# Patient Record
Sex: MALE | Race: WHITE | NOT HISPANIC OR LATINO | Employment: FULL TIME | URBAN - METROPOLITAN AREA
[De-identification: names, ages, dates, MRNs, and addresses within clinical notes are randomized per-mention and may not be internally consistent; named-entity substitution may affect disease eponyms.]

---

## 2020-02-16 ENCOUNTER — APPOINTMENT (EMERGENCY)
Dept: CT IMAGING | Facility: HOSPITAL | Age: 25
End: 2020-02-16
Payer: COMMERCIAL

## 2020-02-16 ENCOUNTER — HOSPITAL ENCOUNTER (EMERGENCY)
Facility: HOSPITAL | Age: 25
End: 2020-02-17
Attending: EMERGENCY MEDICINE
Payer: COMMERCIAL

## 2020-02-16 ENCOUNTER — APPOINTMENT (EMERGENCY)
Dept: RADIOLOGY | Facility: HOSPITAL | Age: 25
End: 2020-02-16
Payer: COMMERCIAL

## 2020-02-16 DIAGNOSIS — S06.0X9A CONCUSSION: ICD-10-CM

## 2020-02-16 DIAGNOSIS — S22.49XA MULTIPLE RIB FRACTURES: ICD-10-CM
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
## 2020-02-16 DIAGNOSIS — S36.520A: ICD-10-CM

## 2020-02-16 DIAGNOSIS — S36.113A LIVER LACERATION, CLOSED, INITIAL ENCOUNTER: ICD-10-CM

## 2020-02-16 DIAGNOSIS — S32.039A CLOSED FRACTURE OF THIRD LUMBAR VERTEBRA, UNSPECIFIED FRACTURE MORPHOLOGY, INITIAL ENCOUNTER (HCC): ICD-10-CM

## 2020-02-16 DIAGNOSIS — S16.1XXA STRAIN OF NECK MUSCLE, INITIAL ENCOUNTER: ICD-10-CM

## 2020-02-16 LAB
ALBUMIN SERPL BCP-MCNC: 4.6 G/DL (ref 3.5–5.7)
ALP SERPL-CCNC: 84 U/L (ref 40–150)
ALT SERPL W P-5'-P-CCNC: 276 U/L (ref 7–52)
ANION GAP SERPL CALCULATED.3IONS-SCNC: 11 MMOL/L (ref 4–13)
AST SERPL W P-5'-P-CCNC: 257 U/L (ref 13–39)
BASOPHILS # BLD AUTO: 0 THOUSANDS/ΜL (ref 0–0.1)
BASOPHILS NFR BLD AUTO: 0 % (ref 0–2)
BILIRUB SERPL-MCNC: 0.4 MG/DL (ref 0.2–1)
BUN SERPL-MCNC: 25 MG/DL (ref 7–25)
CALCIUM SERPL-MCNC: 9.2 MG/DL (ref 8.6–10.5)
CHLORIDE SERPL-SCNC: 108 MMOL/L (ref 98–107)
CO2 SERPL-SCNC: 20 MMOL/L (ref 21–31)
CREAT SERPL-MCNC: 1.28 MG/DL (ref 0.7–1.3)
EOSINOPHIL # BLD AUTO: 0.1 THOUSAND/ΜL (ref 0–0.61)
EOSINOPHIL NFR BLD AUTO: 1 % (ref 0–5)
ERYTHROCYTE [DISTWIDTH] IN BLOOD BY AUTOMATED COUNT: 13.4 % (ref 11.5–14.5)
GFR SERPL CREATININE-BSD FRML MDRD: 78 ML/MIN/1.73SQ M
GLUCOSE SERPL-MCNC: 104 MG/DL (ref 65–99)
HCT VFR BLD AUTO: 43.7 % (ref 42–47)
HGB BLD-MCNC: 14 G/DL (ref 14–18)
INR PPP: 1.12 (ref 0.9–1.5)
LIPASE SERPL-CCNC: <10 U/L (ref 11–82)
LYMPHOCYTES # BLD AUTO: 2.5 THOUSANDS/ΜL (ref 0.6–4.47)
LYMPHOCYTES NFR BLD AUTO: 13 % (ref 21–51)
MCH RBC QN AUTO: 28.9 PG (ref 26–34)
MCHC RBC AUTO-ENTMCNC: 32 G/DL (ref 31–37)
MCV RBC AUTO: 90 FL (ref 81–99)
MONOCYTES # BLD AUTO: 1.2 THOUSAND/ΜL (ref 0.17–1.22)
MONOCYTES NFR BLD AUTO: 6 % (ref 2–12)
NEUTROPHILS # BLD AUTO: 15.9 THOUSANDS/ΜL (ref 1.4–6.5)
NEUTS SEG NFR BLD AUTO: 81 % (ref 42–75)
PLATELET # BLD AUTO: 288 THOUSANDS/UL (ref 149–390)
PMV BLD AUTO: 7.6 FL (ref 8.6–11.7)
POTASSIUM SERPL-SCNC: 3.9 MMOL/L (ref 3.5–5.5)
PROT SERPL-MCNC: 6.7 G/DL (ref 6.4–8.9)
PROTHROMBIN TIME: 12.9 SECONDS (ref 10.2–13)
RBC # BLD AUTO: 4.84 MILLION/UL (ref 4.3–5.9)
SODIUM SERPL-SCNC: 139 MMOL/L (ref 134–143)
WBC # BLD AUTO: 19.7 THOUSAND/UL (ref 4.8–10.8)

## 2020-02-16 PROCEDURE — 72131 CT LUMBAR SPINE W/O DYE: CPT

## 2020-02-16 PROCEDURE — 36415 COLL VENOUS BLD VENIPUNCTURE: CPT | Performed by: EMERGENCY MEDICINE

## 2020-02-16 PROCEDURE — 85610 PROTHROMBIN TIME: CPT | Performed by: EMERGENCY MEDICINE

## 2020-02-16 PROCEDURE — 72125 CT NECK SPINE W/O DYE: CPT

## 2020-02-16 PROCEDURE — 99284 EMERGENCY DEPT VISIT MOD MDM: CPT | Performed by: EMERGENCY MEDICINE

## 2020-02-16 PROCEDURE — 73130 X-RAY EXAM OF HAND: CPT

## 2020-02-16 PROCEDURE — 96361 HYDRATE IV INFUSION ADD-ON: CPT

## 2020-02-16 PROCEDURE — 83690 ASSAY OF LIPASE: CPT | Performed by: EMERGENCY MEDICINE

## 2020-02-16 PROCEDURE — 73590 X-RAY EXAM OF LOWER LEG: CPT

## 2020-02-16 PROCEDURE — 70450 CT HEAD/BRAIN W/O DYE: CPT

## 2020-02-16 PROCEDURE — 80053 COMPREHEN METABOLIC PANEL: CPT | Performed by: EMERGENCY MEDICINE

## 2020-02-16 PROCEDURE — 96374 THER/PROPH/DIAG INJ IV PUSH: CPT

## 2020-02-16 PROCEDURE — 85025 COMPLETE CBC W/AUTO DIFF WBC: CPT | Performed by: EMERGENCY MEDICINE

## 2020-02-16 PROCEDURE — 74177 CT ABD & PELVIS W/CONTRAST: CPT

## 2020-02-16 PROCEDURE — 71260 CT THORAX DX C+: CPT

## 2020-02-16 PROCEDURE — 99285 EMERGENCY DEPT VISIT HI MDM: CPT

## 2020-02-16 RX ORDER — FENTANYL CITRATE 50 UG/ML
25 INJECTION, SOLUTION INTRAMUSCULAR; INTRAVENOUS ONCE
Status: COMPLETED | OUTPATIENT
Start: 2020-02-16 | End: 2020-02-16

## 2020-02-16 RX ORDER — ONDANSETRON 2 MG/ML
4 INJECTION INTRAMUSCULAR; INTRAVENOUS ONCE
Status: DISCONTINUED | OUTPATIENT
Start: 2020-02-16 | End: 2020-02-16

## 2020-02-16 RX ORDER — MORPHINE SULFATE 4 MG/ML
4 INJECTION, SOLUTION INTRAMUSCULAR; INTRAVENOUS ONCE
Status: DISCONTINUED | OUTPATIENT
Start: 2020-02-16 | End: 2020-02-16

## 2020-02-16 RX ORDER — SODIUM CHLORIDE 9 MG/ML
125 INJECTION, SOLUTION INTRAVENOUS CONTINUOUS
Status: DISCONTINUED | OUTPATIENT
Start: 2020-02-16 | End: 2020-02-17 | Stop reason: HOSPADM

## 2020-02-16 RX ORDER — HYDROCODONE BITARTRATE AND ACETAMINOPHEN 5; 325 MG/1; MG/1
1 TABLET ORAL ONCE
Status: COMPLETED | OUTPATIENT
Start: 2020-02-16 | End: 2020-02-16

## 2020-02-16 RX ADMIN — SODIUM CHLORIDE 1000 ML: 0.9 INJECTION, SOLUTION INTRAVENOUS at 20:35

## 2020-02-16 RX ADMIN — SODIUM CHLORIDE 125 ML/HR: 0.9 INJECTION, SOLUTION INTRAVENOUS at 23:23

## 2020-02-16 RX ADMIN — HYDROCODONE BITARTRATE AND ACETAMINOPHEN 1 TABLET: 5; 325 TABLET ORAL at 20:32

## 2020-02-16 RX ADMIN — IOHEXOL 100 ML: 350 INJECTION, SOLUTION INTRAVENOUS at 22:03

## 2020-02-16 RX ADMIN — FENTANYL CITRATE 25 MCG: 50 INJECTION INTRAMUSCULAR; INTRAVENOUS at 23:23

## 2020-02-17 ENCOUNTER — HOSPITAL ENCOUNTER (INPATIENT)
Facility: HOSPITAL | Age: 25
LOS: 1 days | Discharge: HOME | DRG: 184 | End: 2020-02-18
Attending: SURGERY | Admitting: SURGERY
Payer: COMMERCIAL

## 2020-02-17 VITALS
WEIGHT: 185 LBS | DIASTOLIC BLOOD PRESSURE: 64 MMHG | OXYGEN SATURATION: 94 % | TEMPERATURE: 98.3 F | BODY MASS INDEX: 26.48 KG/M2 | SYSTOLIC BLOOD PRESSURE: 128 MMHG | HEIGHT: 70 IN | RESPIRATION RATE: 21 BRPM | HEART RATE: 79 BPM

## 2020-02-17 DIAGNOSIS — S22.41XA CLOSED FRACTURE OF MULTIPLE RIBS OF RIGHT SIDE, INITIAL ENCOUNTER: ICD-10-CM

## 2020-02-17 DIAGNOSIS — D68.2 FACTOR V DEFICIENCY (HCC): Primary | ICD-10-CM

## 2020-02-17 PROBLEM — R79.89 ELEVATED LFTS: Status: ACTIVE | Noted: 2020-02-17

## 2020-02-17 PROBLEM — S36.500A: Status: ACTIVE | Noted: 2020-02-17

## 2020-02-17 PROBLEM — S32.009A LUMBAR TRANSVERSE PROCESS FRACTURE, CLOSED, INITIAL ENCOUNTER (HCC): Status: ACTIVE | Noted: 2020-02-17

## 2020-02-17 PROBLEM — E84.9 CYSTIC FIBROSIS (HCC): Status: ACTIVE | Noted: 2020-02-17

## 2020-02-17 PROBLEM — S36.113A LIVER LACERATION: Status: ACTIVE | Noted: 2020-02-17

## 2020-02-17 PROBLEM — E84.9 CYSTIC FIBROSIS (HCC): Chronic | Status: ACTIVE | Noted: 2020-02-17

## 2020-02-17 PROBLEM — D72.829 LEUKOCYTOSIS: Status: ACTIVE | Noted: 2020-02-17

## 2020-02-17 LAB
ALBUMIN SERPL BCP-MCNC: 3.5 G/DL (ref 3.5–5)
ALP SERPL-CCNC: 89 U/L (ref 46–116)
ALT SERPL W P-5'-P-CCNC: 272 U/L (ref 12–78)
ANION GAP SERPL CALCULATED.3IONS-SCNC: 6 MMOL/L (ref 4–13)
AST SERPL W P-5'-P-CCNC: 161 U/L (ref 5–45)
BASOPHILS # BLD AUTO: 0.03 THOUSANDS/ΜL (ref 0–0.1)
BASOPHILS NFR BLD AUTO: 0 % (ref 0–1)
BILIRUB DIRECT SERPL-MCNC: 0.21 MG/DL (ref 0–0.2)
BILIRUB SERPL-MCNC: 0.65 MG/DL (ref 0.2–1)
BUN SERPL-MCNC: 18 MG/DL (ref 5–25)
CALCIUM SERPL-MCNC: 8.3 MG/DL (ref 8.3–10.1)
CHLORIDE SERPL-SCNC: 112 MMOL/L (ref 100–108)
CO2 SERPL-SCNC: 22 MMOL/L (ref 21–32)
CREAT SERPL-MCNC: 0.98 MG/DL (ref 0.6–1.3)
EOSINOPHIL # BLD AUTO: 0.06 THOUSAND/ΜL (ref 0–0.61)
EOSINOPHIL NFR BLD AUTO: 1 % (ref 0–6)
ERYTHROCYTE [DISTWIDTH] IN BLOOD BY AUTOMATED COUNT: 12.9 % (ref 11.6–15.1)
ERYTHROCYTE [DISTWIDTH] IN BLOOD BY AUTOMATED COUNT: 13.1 % (ref 11.6–15.1)
GFR SERPL CREATININE-BSD FRML MDRD: 107 ML/MIN/1.73SQ M
GLUCOSE SERPL-MCNC: 106 MG/DL (ref 65–140)
HCT VFR BLD AUTO: 35 % (ref 36.5–49.3)
HCT VFR BLD AUTO: 35.7 % (ref 36.5–49.3)
HGB BLD-MCNC: 11.4 G/DL (ref 12–17)
HGB BLD-MCNC: 11.8 G/DL (ref 12–17)
IMM GRANULOCYTES # BLD AUTO: 0.04 THOUSAND/UL (ref 0–0.2)
IMM GRANULOCYTES NFR BLD AUTO: 0 % (ref 0–2)
LYMPHOCYTES # BLD AUTO: 2.7 THOUSANDS/ΜL (ref 0.6–4.47)
LYMPHOCYTES NFR BLD AUTO: 24 % (ref 14–44)
MCH RBC QN AUTO: 29.5 PG (ref 26.8–34.3)
MCH RBC QN AUTO: 29.6 PG (ref 26.8–34.3)
MCHC RBC AUTO-ENTMCNC: 32.6 G/DL (ref 31.4–37.4)
MCHC RBC AUTO-ENTMCNC: 33.1 G/DL (ref 31.4–37.4)
MCV RBC AUTO: 90 FL (ref 82–98)
MCV RBC AUTO: 91 FL (ref 82–98)
MONOCYTES # BLD AUTO: 1.11 THOUSAND/ΜL (ref 0.17–1.22)
MONOCYTES NFR BLD AUTO: 10 % (ref 4–12)
NEUTROPHILS # BLD AUTO: 7.39 THOUSANDS/ΜL (ref 1.85–7.62)
NEUTS SEG NFR BLD AUTO: 65 % (ref 43–75)
NRBC BLD AUTO-RTO: 0 /100 WBCS
PLATELET # BLD AUTO: 190 THOUSANDS/UL (ref 149–390)
PLATELET # BLD AUTO: 219 THOUSANDS/UL (ref 149–390)
PMV BLD AUTO: 9.6 FL (ref 8.9–12.7)
PMV BLD AUTO: 9.7 FL (ref 8.9–12.7)
POTASSIUM SERPL-SCNC: 3.8 MMOL/L (ref 3.5–5.3)
PROT SERPL-MCNC: 6.1 G/DL (ref 6.4–8.2)
RBC # BLD AUTO: 3.86 MILLION/UL (ref 3.88–5.62)
RBC # BLD AUTO: 3.99 MILLION/UL (ref 3.88–5.62)
SODIUM SERPL-SCNC: 140 MMOL/L (ref 136–145)
WBC # BLD AUTO: 11.33 THOUSAND/UL (ref 4.31–10.16)
WBC # BLD AUTO: 9.12 THOUSAND/UL (ref 4.31–10.16)

## 2020-02-17 PROCEDURE — 80048 BASIC METABOLIC PNL TOTAL CA: CPT | Performed by: PHYSICIAN ASSISTANT

## 2020-02-17 PROCEDURE — 80076 HEPATIC FUNCTION PANEL: CPT | Performed by: PHYSICIAN ASSISTANT

## 2020-02-17 PROCEDURE — 85027 COMPLETE CBC AUTOMATED: CPT | Performed by: PHYSICIAN ASSISTANT

## 2020-02-17 PROCEDURE — 99231 SBSQ HOSP IP/OBS SF/LOW 25: CPT | Performed by: SURGERY

## 2020-02-17 PROCEDURE — 94640 AIRWAY INHALATION TREATMENT: CPT

## 2020-02-17 PROCEDURE — NC001 PR NO CHARGE: Performed by: SURGERY

## 2020-02-17 PROCEDURE — 97166 OT EVAL MOD COMPLEX 45 MIN: CPT

## 2020-02-17 PROCEDURE — 85025 COMPLETE CBC W/AUTO DIFF WBC: CPT | Performed by: EMERGENCY MEDICINE

## 2020-02-17 PROCEDURE — 99291 CRITICAL CARE FIRST HOUR: CPT | Performed by: SURGERY

## 2020-02-17 PROCEDURE — 96361 HYDRATE IV INFUSION ADD-ON: CPT

## 2020-02-17 PROCEDURE — 96376 TX/PRO/DX INJ SAME DRUG ADON: CPT

## 2020-02-17 PROCEDURE — 97163 PT EVAL HIGH COMPLEX 45 MIN: CPT

## 2020-02-17 PROCEDURE — 94760 N-INVAS EAR/PLS OXIMETRY 1: CPT

## 2020-02-17 PROCEDURE — 99285 EMERGENCY DEPT VISIT HI MDM: CPT

## 2020-02-17 RX ORDER — CHLORHEXIDINE GLUCONATE 0.12 MG/ML
15 RINSE ORAL EVERY 12 HOURS SCHEDULED
Status: DISCONTINUED | OUTPATIENT
Start: 2020-02-17 | End: 2020-02-18 | Stop reason: HOSPADM

## 2020-02-17 RX ORDER — LEVALBUTEROL 1.25 MG/.5ML
1.25 SOLUTION, CONCENTRATE RESPIRATORY (INHALATION)
Status: DISCONTINUED | OUTPATIENT
Start: 2020-02-17 | End: 2020-02-18 | Stop reason: HOSPADM

## 2020-02-17 RX ORDER — LIDOCAINE 50 MG/G
1 PATCH TOPICAL DAILY
Status: DISCONTINUED | OUTPATIENT
Start: 2020-02-17 | End: 2020-02-18 | Stop reason: HOSPADM

## 2020-02-17 RX ORDER — MELATONIN
5000 DAILY
Status: DISCONTINUED | OUTPATIENT
Start: 2020-02-17 | End: 2020-02-18 | Stop reason: HOSPADM

## 2020-02-17 RX ORDER — POTASSIUM CHLORIDE 20 MEQ/1
20 TABLET, EXTENDED RELEASE ORAL ONCE
Status: COMPLETED | OUTPATIENT
Start: 2020-02-17 | End: 2020-02-17

## 2020-02-17 RX ORDER — MINOCYCLINE HYDROCHLORIDE 100 MG/1
100 CAPSULE ORAL 2 TIMES DAILY
Status: ON HOLD | COMMUNITY
Start: 2018-08-22 | End: 2020-02-17

## 2020-02-17 RX ORDER — GENTAMICIN SULFATE 40 MG/ML
300 INJECTION, SOLUTION INTRAMUSCULAR; INTRAVENOUS 2 TIMES DAILY
COMMUNITY
Start: 2018-04-24

## 2020-02-17 RX ORDER — FLUTICASONE FUROATE AND VILANTEROL 200; 25 UG/1; UG/1
1 POWDER RESPIRATORY (INHALATION) DAILY
Status: DISCONTINUED | OUTPATIENT
Start: 2020-02-17 | End: 2020-02-17

## 2020-02-17 RX ORDER — HYDROMORPHONE HCL/PF 1 MG/ML
0.5 SYRINGE (ML) INJECTION EVERY 4 HOURS PRN
Status: DISCONTINUED | OUTPATIENT
Start: 2020-02-17 | End: 2020-02-18 | Stop reason: HOSPADM

## 2020-02-17 RX ORDER — LEVALBUTEROL 1.25 MG/.5ML
1.25 SOLUTION, CONCENTRATE RESPIRATORY (INHALATION) 2 TIMES DAILY
Status: DISCONTINUED | OUTPATIENT
Start: 2020-02-17 | End: 2020-02-17

## 2020-02-17 RX ORDER — ALBUTEROL SULFATE 2.5 MG/3ML
2.5 SOLUTION RESPIRATORY (INHALATION) EVERY 6 HOURS PRN
COMMUNITY
Start: 2017-12-11

## 2020-02-17 RX ORDER — FENTANYL CITRATE 50 UG/ML
25 INJECTION, SOLUTION INTRAMUSCULAR; INTRAVENOUS ONCE
Status: COMPLETED | OUTPATIENT
Start: 2020-02-17 | End: 2020-02-17

## 2020-02-17 RX ORDER — PANTOPRAZOLE SODIUM 40 MG/1
40 TABLET, DELAYED RELEASE ORAL
Status: DISCONTINUED | OUTPATIENT
Start: 2020-02-18 | End: 2020-02-18 | Stop reason: HOSPADM

## 2020-02-17 RX ORDER — SODIUM CHLORIDE FOR INHALATION 7 %
4 VIAL, NEBULIZER (ML) INHALATION 2 TIMES DAILY
COMMUNITY
Start: 2018-03-30

## 2020-02-17 RX ORDER — IBUPROFEN 400 MG/1
400 TABLET ORAL EVERY 6 HOURS SCHEDULED
Status: DISCONTINUED | OUTPATIENT
Start: 2020-02-17 | End: 2020-02-17

## 2020-02-17 RX ORDER — AMOXICILLIN 250 MG
1 CAPSULE ORAL 2 TIMES DAILY
Status: DISCONTINUED | OUTPATIENT
Start: 2020-02-17 | End: 2020-02-18 | Stop reason: HOSPADM

## 2020-02-17 RX ORDER — ONDANSETRON 2 MG/ML
4 INJECTION INTRAMUSCULAR; INTRAVENOUS EVERY 4 HOURS PRN
Status: DISCONTINUED | OUTPATIENT
Start: 2020-02-17 | End: 2020-02-18 | Stop reason: HOSPADM

## 2020-02-17 RX ORDER — ALBUTEROL SULFATE 90 UG/1
2 AEROSOL, METERED RESPIRATORY (INHALATION) EVERY 4 HOURS PRN
Status: DISCONTINUED | OUTPATIENT
Start: 2020-02-17 | End: 2020-02-18 | Stop reason: HOSPADM

## 2020-02-17 RX ORDER — LEVALBUTEROL TARTRATE 45 UG/1
1-2 AEROSOL, METERED ORAL EVERY 4 HOURS PRN
COMMUNITY
Start: 2019-10-28

## 2020-02-17 RX ORDER — SODIUM CHLORIDE 30 MG/ML INHALATION SOLUTION 30 MG/ML
4 SOLUTION INHALANT 2 TIMES DAILY
Status: DISCONTINUED | OUTPATIENT
Start: 2020-02-17 | End: 2020-02-17

## 2020-02-17 RX ORDER — PANTOPRAZOLE SODIUM 40 MG/1
40 TABLET, DELAYED RELEASE ORAL
COMMUNITY
Start: 2018-03-26

## 2020-02-17 RX ORDER — OXYCODONE HYDROCHLORIDE 5 MG/1
5 TABLET ORAL EVERY 4 HOURS PRN
Status: DISCONTINUED | OUTPATIENT
Start: 2020-02-17 | End: 2020-02-18 | Stop reason: HOSPADM

## 2020-02-17 RX ORDER — PANTOPRAZOLE SODIUM 40 MG/1
40 TABLET, DELAYED RELEASE ORAL
Status: DISCONTINUED | OUTPATIENT
Start: 2020-02-17 | End: 2020-02-17

## 2020-02-17 RX ORDER — ACETAMINOPHEN 325 MG/1
975 TABLET ORAL EVERY 8 HOURS SCHEDULED
Status: DISCONTINUED | OUTPATIENT
Start: 2020-02-17 | End: 2020-02-18 | Stop reason: HOSPADM

## 2020-02-17 RX ORDER — POLYETHYLENE GLYCOL 3350 17 G/17G
17 POWDER, FOR SOLUTION ORAL DAILY PRN
Status: DISCONTINUED | OUTPATIENT
Start: 2020-02-17 | End: 2020-02-18 | Stop reason: HOSPADM

## 2020-02-17 RX ORDER — SODIUM CHLORIDE 30 MG/ML INHALATION SOLUTION 30 MG/ML
4 SOLUTION INHALANT
Status: DISCONTINUED | OUTPATIENT
Start: 2020-02-17 | End: 2020-02-18 | Stop reason: HOSPADM

## 2020-02-17 RX ORDER — METHOCARBAMOL 500 MG/1
500 TABLET, FILM COATED ORAL EVERY 6 HOURS SCHEDULED
Status: DISCONTINUED | OUTPATIENT
Start: 2020-02-17 | End: 2020-02-18 | Stop reason: HOSPADM

## 2020-02-17 RX ORDER — LEVALBUTEROL 1.25 MG/.5ML
1.25 SOLUTION, CONCENTRATE RESPIRATORY (INHALATION) 2 TIMES DAILY
COMMUNITY
Start: 2019-10-28

## 2020-02-17 RX ORDER — OXYCODONE HYDROCHLORIDE 10 MG/1
10 TABLET ORAL EVERY 4 HOURS PRN
Status: DISCONTINUED | OUTPATIENT
Start: 2020-02-17 | End: 2020-02-18 | Stop reason: HOSPADM

## 2020-02-17 RX ADMIN — OXYCODONE HYDROCHLORIDE 10 MG: 10 TABLET ORAL at 16:24

## 2020-02-17 RX ADMIN — PANCRELIPASE 36000 UNITS: 24000; 76000; 120000 CAPSULE, DELAYED RELEASE PELLETS ORAL at 08:26

## 2020-02-17 RX ADMIN — HYDROMORPHONE HYDROCHLORIDE 0.5 MG: 1 INJECTION, SOLUTION INTRAMUSCULAR; INTRAVENOUS; SUBCUTANEOUS at 03:29

## 2020-02-17 RX ADMIN — CHLORHEXIDINE GLUCONATE 0.12% ORAL RINSE 15 ML: 1.2 LIQUID ORAL at 08:25

## 2020-02-17 RX ADMIN — METHOCARBAMOL TABLETS 500 MG: 500 TABLET, COATED ORAL at 23:36

## 2020-02-17 RX ADMIN — SODIUM CHLORIDE SOLN NEBU 3% 4 ML: 3 NEBU SOLN at 07:43

## 2020-02-17 RX ADMIN — PANCRELIPASE 36000 UNITS: 24000; 76000; 120000 CAPSULE, DELAYED RELEASE PELLETS ORAL at 11:32

## 2020-02-17 RX ADMIN — ACETAMINOPHEN 975 MG: 325 TABLET ORAL at 06:17

## 2020-02-17 RX ADMIN — CHLORHEXIDINE GLUCONATE 0.12% ORAL RINSE 15 ML: 1.2 LIQUID ORAL at 21:41

## 2020-02-17 RX ADMIN — Medication 1 TABLET: at 08:25

## 2020-02-17 RX ADMIN — ENOXAPARIN SODIUM 30 MG: 30 INJECTION SUBCUTANEOUS at 21:41

## 2020-02-17 RX ADMIN — NYSTATIN 500000 UNITS: 100000 SUSPENSION ORAL at 11:22

## 2020-02-17 RX ADMIN — PANTOPRAZOLE SODIUM 40 MG: 40 TABLET, DELAYED RELEASE ORAL at 06:16

## 2020-02-17 RX ADMIN — LIDOCAINE 1 PATCH: 50 PATCH TOPICAL at 08:25

## 2020-02-17 RX ADMIN — ACETAMINOPHEN 975 MG: 325 TABLET ORAL at 15:23

## 2020-02-17 RX ADMIN — OXYCODONE HYDROCHLORIDE 5 MG: 5 TABLET ORAL at 23:38

## 2020-02-17 RX ADMIN — ACETAMINOPHEN 975 MG: 325 TABLET ORAL at 21:41

## 2020-02-17 RX ADMIN — LEVALBUTEROL HYDROCHLORIDE 1.25 MG: 1.25 SOLUTION, CONCENTRATE RESPIRATORY (INHALATION) at 21:20

## 2020-02-17 RX ADMIN — OXYCODONE HYDROCHLORIDE 5 MG: 5 TABLET ORAL at 11:30

## 2020-02-17 RX ADMIN — OXYCODONE HYDROCHLORIDE 5 MG: 5 TABLET ORAL at 03:30

## 2020-02-17 RX ADMIN — POTASSIUM CHLORIDE 20 MEQ: 1500 TABLET, EXTENDED RELEASE ORAL at 06:17

## 2020-02-17 RX ADMIN — MELATONIN 5000 UNITS: at 08:25

## 2020-02-17 RX ADMIN — METHOCARBAMOL TABLETS 500 MG: 500 TABLET, COATED ORAL at 17:47

## 2020-02-17 RX ADMIN — LEVALBUTEROL HYDROCHLORIDE 1.25 MG: 1.25 SOLUTION, CONCENTRATE RESPIRATORY (INHALATION) at 07:42

## 2020-02-17 RX ADMIN — NYSTATIN 500000 UNITS: 100000 SUSPENSION ORAL at 08:25

## 2020-02-17 RX ADMIN — FENTANYL CITRATE 25 MCG: 50 INJECTION INTRAMUSCULAR; INTRAVENOUS at 01:14

## 2020-02-17 RX ADMIN — DORNASE ALFA 2.5 MG: 1 SOLUTION RESPIRATORY (INHALATION) at 08:05

## 2020-02-17 RX ADMIN — METHOCARBAMOL TABLETS 500 MG: 500 TABLET, COATED ORAL at 06:17

## 2020-02-17 RX ADMIN — SENNOSIDES AND DOCUSATE SODIUM 1 TABLET: 8.6; 5 TABLET ORAL at 17:47

## 2020-02-17 RX ADMIN — IBUPROFEN 400 MG: 400 TABLET ORAL at 06:16

## 2020-02-17 RX ADMIN — POLYETHYLENE GLYCOL 3350 17 G: 17 POWDER, FOR SOLUTION ORAL at 16:33

## 2020-02-17 RX ADMIN — PANCRELIPASE 36000 UNITS: 24000; 76000; 120000 CAPSULE, DELAYED RELEASE PELLETS ORAL at 18:30

## 2020-02-17 RX ADMIN — SODIUM CHLORIDE SOLN NEBU 3% 4 ML: 3 NEBU SOLN at 21:20

## 2020-02-17 RX ADMIN — METHOCARBAMOL TABLETS 500 MG: 500 TABLET, COATED ORAL at 11:22
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
## 2020-02-17 NOTE — SOCIAL WORK
CM met with pt and his family to discuss the role of CM  Pt lives with his father currently but plans to move to his mother's home upon d/c  Mother's home is a 1 story home which has 2STE  Pt works, drives, and was fully independent PTA  Pt owns no DME or living will  Pt's pharmacy is either [facility] or [facility] in [place] on [address]  Pt has no hx of mental health, substance abuse, IP rehab, or VNA  Pt will d/c home once medically stable  Pt would like to use meds to beds  CM reviewed d/c planning process including the following: identifying help at home, patient preference for d/c planning needs, Discharge Lounge, [facility] Meds to Bed program, availability of treatment team to discuss questions or concerns patient and/or family may have regarding understanding medications and recognizing signs and symptoms once discharged  CM also encouraged patient to follow up with all recommended appointments after discharge  Patient advised of importance for patient and family to participate in managing patients medical well being

## 2020-02-17 NOTE — ED PROVIDER NOTES
History  Chief Complaint   Patient presents with    Skiing Accident     pt lost ski and stopped to replace it; was run into by another skiier; c/o multiple sites of pain (right lower back, right hand and wrist, right shin, ribs ("wrapping around from my back"), right shin (small raised and tender area noted to lower shin)     Sp skiing accident  States he fell on skis and was getting up when he was blind sided by another skier  No loc, helmet on  Co lower back pain, right shin pain, right chest wall pain  No sob  Not anticoagulated  Denies reticular sx  None       Past Medical History:   Diagnosis Date    Cystic fibrosis ([facility] [address] )     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[facility] )        History reviewed  No pertinent surgical history  History reviewed  No pertinent family history  I have reviewed and agree with the history as documented  Social History     Tobacco Use    Smoking status: Never Smoker    Smokeless tobacco: Never Used   Substance Use Topics    Alcohol use: Yes     Comment: socially    Drug use: Never       Review of Systems   All other systems reviewed and are negative  Physical Exam  Physical Exam   Constitutional: He is oriented to person, place, and time  No distress  HENT:   Head: Normocephalic and atraumatic  Right Ear: External ear normal    Left Ear: External ear normal    Nose: Nose normal    Mouth/Throat: Oropharynx is clear and moist  No oropharyngeal exudate  Eyes: Pupils are equal, round, and reactive to light  Conjunctivae and EOM are normal  Right eye exhibits no discharge  Left eye exhibits no discharge  Neck: Normal range of motion  Neck supple  No JVD present  No tracheal deviation present  Cardiovascular: Normal heart sounds and intact distal pulses  Exam reveals no gallop and no friction rub  No murmur heard  Pulmonary/Chest: No stridor  No respiratory distress  He has no wheezes  He has no rales  He exhibits tenderness  Abdominal: Soft   Bowel sounds are normal  He exhibits no distension and no mass  There is tenderness  There is no rebound and no guarding  No hernia  Musculoskeletal: Normal range of motion  He exhibits no edema, tenderness or deformity  Mild ruq tenderness, soft abd wo peritoneal signs    Mild right chest wall tenderness  Lumbar spine tender, no other spinal tenderness  Head atraumatic  Neuro fully intact  Neurological: He is alert and oriented to person, place, and time  He displays normal reflexes  No sensory deficit  He exhibits normal muscle tone  Skin: Skin is warm and dry  Capillary refill takes less than 2 seconds  No rash noted  He is not diaphoretic  No pallor  Psychiatric: He has a normal mood and affect         Vital Signs  ED Triage Vitals [02/16/20 1955]   Temperature Pulse Respirations Blood Pressure SpO2   98 3 °F (36 8 °C) 100 18 140/90 98 %      Temp Source Heart Rate Source Patient Position - Orthostatic VS BP Location FiO2 (%)   Temporal Monitor Lying Left arm --      Pain Score       Worst Possible Pain           Vitals:    02/17/20 0000 02/17/20 0015 02/17/20 0030 02/17/20 0045   BP: 121/68 126/65 130/67 128/64   Pulse: 83 79 78 79   Patient Position - Orthostatic VS:             Visual Acuity      ED Medications  Medications   sodium chloride 0 9 % bolus 1,000 mL (0 mL Intravenous Stopped 2/16/20 2322)   HYDROcodone-acetaminophen (NORCO) 5-325 mg per tablet 1 tablet (1 tablet Oral Given 2/16/20 2032)   iohexol (OMNIPAQUE) 350 MG/ML injection (MULTI-DOSE) 100 mL (100 mL Intravenous Given 2/16/20 2203)   fentanyl citrate (PF) 100 MCG/2ML 25 mcg (25 mcg Intravenous Given 2/16/20 2323)   fentanyl citrate (PF) 100 MCG/2ML 25 mcg (25 mcg Intravenous Given 2/17/20 0114)       Diagnostic Studies  Results Reviewed     Procedure Component Value Units Date/Time    Lipase [[id number]]  (Abnormal) Collected:  02/16/20 2028    Lab Status:  Final result Specimen:  Blood Updated:  02/16/20 2232     Lipase <10 u/L     Protime-INR [[id number]] (Normal) Collected:  02/16/20 2219    Lab Status:  Final result Specimen:  Blood from Arm, Right Updated:  02/16/20 2229     Protime 12 9 seconds      INR 1 12    Comprehensive metabolic panel [[id number]]  (Abnormal) Collected:  02/16/20 2028    Lab Status:  Final result Specimen:  Blood from Arm, Left Updated:  02/16/20 2051     Sodium 139 mmol/L      Potassium 3 9 mmol/L      Chloride 108 mmol/L      CO2 20 mmol/L      ANION GAP 11 mmol/L      BUN 25 mg/dL      Creatinine 1 28 mg/dL      Glucose 104 mg/dL      Calcium 9 2 mg/dL       U/L       U/L      Alkaline Phosphatase 84 U/L      Total Protein 6 7 g/dL      Albumin 4 6 g/dL      Total Bilirubin 0 40 mg/dL      eGFR 78 ml/min/1 73sq m     Narrative:       [place] guidelines for Chronic Kidney Disease (CKD):     Stage 1 with normal or high GFR (GFR > 90 mL/min/1 73 square meters)    Stage 2 Mild CKD (GFR = 60-89 mL/min/1 73 square meters)    Stage 3A Moderate CKD (GFR = 45-59 mL/min/1 73 square meters)    Stage 3B Moderate CKD (GFR = 30-44 mL/min/1 73 square meters)    Stage 4 Severe CKD (GFR = 15-29 mL/min/1 73 square meters)    Stage 5 End Stage CKD (GFR <15 mL/min/1 73 square meters)  Note: GFR calculation is accurate only with a steady state creatinine    CBC and differential [[id number]]  (Abnormal) Collected:  02/16/20 2028    Lab Status:  Final result Specimen:  Blood from Arm, Left Updated:  02/16/20 2036     WBC 19 70 Thousand/uL      RBC 4 84 Million/uL      Hemoglobin 14 0 g/dL      Hematocrit 43 7 %      MCV 90 fL      MCH 28 9 pg      MCHC 32 0 g/dL      RDW 13 4 %      MPV 7 6 fL      Platelets 666 Thousands/uL      Neutrophils Relative 81 %      Lymphocytes Relative 13 %      Monocytes Relative 6 %      Eosinophils Relative 1 %      Basophils Relative 0 %      Neutrophils Absolute 15 90 Thousands/µL      Lymphocytes Absolute 2 50 Thousands/µL      Monocytes Absolute 1 20 Thousand/µL      Eosinophils Absolute 0 10 Thousand/µL      Basophils Absolute 0 00 Thousands/µL                  TRAUMA - CT chest abdomen pelvis w contrast   Final Result by Nancy Blankenship MD (02/16 2310)      There is fracture of the right transverse process of L3  There is nondisplaced fracture of the right 12th rib, posteriorly  There appear to be nondisplaced hairline fractures of the right 6th through 9th ribs, posteriorly  There is a small amount of free fluid adjacent to the distal tip of the right lobe of the liver  There are findings suspicious for small hepatic laceration in the subcapsular region of the posterior segment right hepatic lobe  Clinical correlation and    follow-up is recommended  Findings suspicious for injury to the ascending colon near the hepatic flexure of the colon, as described above  Please see discussion  Close clinical correlation and follow-up is recommended  The pancreas is markedly atrophic  Small renal cysts  No hydronephrosis  I personally discussed this study with Dr Gillian Simmonds on 2/16/2020 at 10:31 PM                Workstation performed: [id number]         CT spine lumbar without contrast   Final Result by Nancy Blankenship MD (02/16 2310)      There is fracture of the right transverse process of L3  There is nondisplaced fracture of the right 12th rib, posteriorly  There appear to be nondisplaced hairline fractures of the right 6th through 9th ribs, posteriorly  There is a small amount of free fluid adjacent to the distal tip of the right lobe of the liver  There are findings suspicious for small hepatic laceration in the subcapsular region of the posterior segment right hepatic lobe  Clinical correlation and    follow-up is recommended  Findings suspicious for injury to the ascending colon near the hepatic flexure of the colon, as described above  Please see discussion  Close clinical correlation and follow-up is recommended        The pancreas is markedly atrophic  Small renal cysts  No hydronephrosis  I personally discussed this study with Dr Juve Connell on 2/16/2020 at 10:31 PM                Workstation performed: [id number]         TRAUMA - CT spine cervical wo contrast   Final Result by Omega Alvarez MD (02/16 2310)      No cervical spine fracture or traumatic malalignment  I personally discussed this study with Dr Juve Connell on 2/16/2020 at 10:14 PM                       Workstation performed: [id number]         TRAUMA - CT head wo contrast   Final Result by Omega Alvarez MD (02/16 2311)      No acute intracranial abnormality  Paranasal sinus disease, as described above  Please see discussion  I personally discussed this study with Dr Juve Connell on 2/16/2020 at 10:04 PM                            Workstation performed: [id number]         XR hand 3+ views RIGHT   Final Result by Monisha Lees DO (02/17 5482)      No acute osseous abnormality  Workstation performed: [id number]         XR tibia fibula 2 views RIGHT   Final Result by Nate Fairbanks MD (02/17 5411)      No acute osseous abnormality  Workstation performed: [id number]                    Procedures  Procedures         ED Course  ED Course as of Feb 18 1321   Sun Feb 16, 2020 2153 TRAUMA - CT chest abdomen pelvis w contrast   2154 TRAUMA - CT chest abdomen pelvis w contrast   2257 Eosinophils: 1                               MDM  Number of Diagnoses or Management Options  Closed fracture of third lumbar vertebra, unspecified fracture morphology, initial encounter [facility]):   Concussion:   Contusion of ascending colon, initial encounter:   Liver laceration, closed, initial encounter:   Multiple rib fractures:   Skiing accident, initial encounter:   Strain of neck muscle, initial encounter:   Diagnosis management comments: Pt has been signed out to dr Nellie Malone pending final dispo and ct results           Disposition  Final diagnoses:   Skiing accident, initial encounter   Strain of neck muscle, initial encounter   Concussion   Liver laceration, closed, initial encounter   Contusion of ascending colon, initial encounter   Closed fracture of third lumbar vertebra, unspecified fracture morphology, initial encounter [facility])   Multiple rib fractures     Time reflects when diagnosis was documented in both MDM as applicable and the Disposition within this note     Time User Action Codes Description Comment    2/16/2020 11:12 PM Tom Kitchen Add [address] Skiing accident, initial encounter     2/16/2020 11:12 PM Tom Kitchen Add [S16  1XXA] Strain of neck muscle, initial encounter     2/16/2020 11:12 PM Tom Kitchen Add [S06 0X9A] Concussion     2/16/2020 11:13 PM Tom Kitchen Add [P05 139D] Liver laceration, closed, initial encounter     2/16/2020 11:13 PM Tom Kitchen Add [S36 520A] Contusion of ascending colon, initial encounter     2/16/2020 11:13 PM Tom Kitchen Add [S32 039A] Closed fracture of third lumbar vertebra, unspecified fracture morphology, initial encounter ([facility] [address] )     2/16/2020 11:14 PM Tom Kitchen Add [S22 49XA] Multiple rib fractures       ED Disposition     ED Disposition Condition Date/Time Comment    Transfer to Another Facility-In [facility] Feb 16, 2020 11:14 PM Jordy Grant should be transferred out to [facility] Documentation      Most Recent Value   Patient Condition  The patient has been stabilized such that within reasonable medical probability, no material deterioration of the patient condition or the condition of the unborn child(cameron) is likely to result from the transfer, No noted underlying medical condition requiring transfer to another facility   Transfer is per preference and request of patient and/or family   Reason for Transfer  Level of Care needed not available at this facility   Benefits of Transfer  Specialized equipment and/or services available at the receiving facility (Include comment)________________________   Risks of Transfer  Potential for delay in receiving treatment, Potential deterioration of medical condition, Loss of IV, Increased discomfort during transfer, Possible worsening of condition or death during transfer   Accepting Physician  Dr Sven Hudson Name, Trent Samples by (Company and Unit #)  [facility] WITH [facility]   Sending MD London Dan      RN Documentation      Most [facility] Name, [facility] 284    Bed Assignment  ED Trauma   Report Given to  Loretta Baeza RN   [facility]  [facility]      Follow-up Information    None         There are no discharge medications for this patient  No discharge procedures on file      PDMP Review       Value Time User    PDMP Reviewed  Yes 2/18/2020 10:33 AM Glenn York PA-C          ED Provider  Electronically Signed by           Lux Rowe MD  02/18/20 [phone]

## 2020-02-17 NOTE — ASSESSMENT & PLAN NOTE
? Continue albuterol PRN, breo-ellipta, xopenex, pulmozyme, sodium chloride nebulizer, trikafta (patient brought this medication)

## 2020-02-17 NOTE — ASSESSMENT & PLAN NOTE
CT abd/pelvis 2/17: Findings suspicious for small hepatic laceration in the subcapsular region of posterior segment R hepatic lobe  · LFT trending down   · Serial LFTs

## 2020-02-17 NOTE — ASSESSMENT & PLAN NOTE
· CT abd/pelvis 2/17: Concerning for injury to ascending colon near hepatic flexure  Atrophic pancreas  § Serial exams  § Serial Hgbs  ?  Dx atrophic pancreas  § Likely 2/2 CF  § Continue Creon (pancrelipase) for atrophic pancreas

## 2020-02-17 NOTE — PLAN OF CARE
Problem: PHYSICAL THERAPY ADULT  Goal: Performs mobility at highest level of function for planned discharge setting  See evaluation for individualized goals  Description  Treatment/Interventions: Functional transfer training, Endurance training, Patient/family training, Bed mobility, Gait training, OT, Spoke to nursing  Equipment Recommended: (TBD)       See flowsheet documentation for full assessment, interventions and recommendations  Note:   Prognosis: Good  Problem List: Decreased strength, Decreased range of motion, Decreased endurance, Impaired balance, Decreased mobility, Pain  Assessment: Pt is 26 y o  male seen for PT evaluation s/p admit to [facility] on 2/17/2020 w/ Liver laceration, R rib fx 6-9;12, L3 TP fx s/p blunt skiing accident  PT consulted to assess pt's functional mobility and d/c needs  Order placed for PT eval and tx, w/ up w/ A, up as tolerated and ambulate patient order  Comorbidities affecting pt's physical performance at time of assessment include: cystic fibrosis  PTA, pt was ambulates unrestricted distances and all terrain, lives in multi-level home and works full time  Personal factors affecting pt at time of IE include: ambulating w/ assistive device, inability to navigate level surfaces w/o external assistance, inability to perform current job functions, unable to perform physical activity, inability to perform IADLs and inability to perform ADLs  Please find objective findings from PT assessment regarding body systems outlined above with impairments and limitations including weakness, decreased ROM, impaired balance, decreased endurance, gait deviations, pain and decreased activity tolerance  Pt demonstrated ability to complete bed mobility with increased time and S  Initially standing demonstated decreased standing posture and WB tolerance through painful RLE  Trial with RW which improved tolerance for ambulation with pain in RLE   Ambulated with slow step to gait pattern  Required breathing technique instruction during transfers with frequent breath holding  Pt will benefit from inpt skilled PT as he is functioning below baseline mobility which he is very physical at his job as a   The following objective measures performed on IE also reveal limitations: Barthel Index: 75/100  Pt's clinical presentation is currently unstable/unpredictable seen in pt's presentation of critical care monitoring, pain  Pt to benefit from continued PT tx to address deficits as defined above and maximize level of functional independent mobility and consistency  From PT/mobility standpoint, recommendation at time of d/c would be home with increased family support pending progress in order to facilitate return to PLOF  Barriers to Discharge: Inaccessible home environment  Barriers to Discharge Comments: Pt reports he will temp stay with mother  Recommendation: Home with family support     PT - OK to Discharge: Yes    See flowsheet documentation for full assessment

## 2020-02-17 NOTE — OCCUPATIONAL THERAPY NOTE
Occupational Therapy Evaluation     Patient Name: Leila Patiño  [id number] Date: 2/17/2020  Problem List  Principal Problem:    Possible liver laceration  Active Problems:    Fractures of the right 6-9 and 12th ribs    Ascending colon injury    Right transverse process fracture of L3    Cystic fibrosis (HCC)    Elevated LFTs    Leukocytosis    Past Medical History  Past Medical History:   Diagnosis Date    Cyst[facility] ([facility] [address] )     [facility] V deficiency ([facility] )      Past Surgical History  History reviewed  No pertinent surgical history  02/17/20 1021   Note Type   Note type Eval/Treat   Restrictions/Precautions   Weight Bearing Precautions Per Order No   Pain Assessment   Pain Assessment 0-10   Pain Score 5   Pain Type Acute pain   Pain Location Rib cage   Pain Orientation Right   Hospital Pain Intervention(s) Repositioned; Ambulation/increased activity; Emotional support   Home Living   Type of [address] Multi-level   Additional Comments pt reports he plans to stay with mother in ranch home with 2 TOM - mother is home and able to assist prn   Prior Function   Level of [place] Independent with ADLs and functional mobility   Lives With [employer] Help From Family   ADL Assistance Independent   IADLs Independent   Falls in the last 6 months 0   Vocational Full time employment   Lifestyle   Autonomy I adls and mobility - i iadls - shares homemaking with family   Reciprocal Relationships supportive family and friends   Service to Others works FT as    Intrinsic Gratification active pta   [address] [facility] offers no c/o    ADL   Eating Assistance 7  Independent   Grooming Assistance 7  [facility] 7  [address] 5  [address]  7  [address] 5  [address]  5  Supervision/Setup   Bed Mobility   Supine to   [address] Sit to Stand 5  Supervision   Stand to Sit 5  Supervision   Stand pivot 5  Supervision   Functional Mobility   Functional Mobility 5  Supervision   Additional items Rolling walker   Balance   Static Sitting Fair +   Dynamic Sitting Fair   Static Standing Fair   Dynamic Standing Fair -   Ambulatory Fair -   Activity Tolerance   Activity Tolerance Patient limited by fatigue;Patient limited by pain   RUE Assessment   RUE Assessment WFL   LUE Assessment   LUE Assessment WFL   Cognition   Overall Cognitive Status WFL   Assessment   Limitation Decreased endurance;Decreased self-care trans;Decreased high-level ADLs   Prognosis Good   Assessment Pt is a 26 y o  male who was admitted to [facility] on 2/17/2020 with Liver laceration and rib fx s/p collision with another person while skiing  Pt's problem list also includes PMH of pulmonary fibrosis  At baseline pt was completing adls and mobility independently w/o ad - I iadls   Pt lives with father in 2 story home with +TOM - reports he plans to stay with mother in 1 story home post d/c - mother is available to assist prn  Currently pt requires sba for overall ADLS and sba for functional mobility/transfers  Pt currently presents with impairments in the following categories -steps to enter environment and difficulty performing IADLS  activity tolerance, endurance and standing balance/tolerance  These impairments, as well as pt's fatigue and pain  limit pt's ability to safely engage in all baseline areas of occupation, includingfunctional mobility/transfers, community mobility, laundry , driving, house maintenance, meal prep, cleaning, work/volunteer work , social participation  and leisure activities however will have adequate support available prn post d/c from family and friends -  From OT standpoint, recommend home with family support  upon D/C  No further acute OT needs indicated at this time - Recommend continued oob for meals, ambulation to/from BR, setup for self care tasks and mobility in hallway with nursing/restorative - d/c from caseload with above recommendations   Goals   Patient Goals have less pain and go home    Plan   OT Frequency Eval only   Recommendation   OT Discharge Recommendation Home with family support   OT - OK to Discharge Yes     Claudeen Ares, OT
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
## 2020-02-17 NOTE — PLAN OF CARE
Problem: Prexisting or High Potential for Compromised Skin Integrity  Goal: Skin integrity is maintained or improved  Description  INTERVENTIONS:  - Identify patients at risk for skin breakdown  - Assess and monitor skin integrity  - Assess and monitor nutrition and hydration status  - Monitor labs   - Assess for incontinence   - Turn and reposition patient  - Assist with mobility/ambulation  - Relieve pressure over bony prominences  - Avoid friction and shearing  - Provide appropriate hygiene as needed including keeping skin clean and dry  - Evaluate need for skin moisturizer/barrier cream  - Collaborate with interdisciplinary team   - Patient/family teaching  - Consider wound care consult   Outcome: Progressing     Problem: Potential for Falls  Goal: Patient will remain free of falls  Description  INTERVENTIONS:  - Assess patient frequently for physical needs  -  Identify cognitive and physical deficits and behaviors that affect risk of falls    -  [place] fall precautions as indicated by assessment   - Educate patient/family on patient safety including physical limitations  - Instruct patient to call for assistance with activity based on assessment  - Modify environment to reduce risk of injury  - Consider OT/PT consult to assist with strengthening/mobility  Outcome: Progressing     Problem: COPING  Goal: Pt/Family able to verbalize concerns and demonstrate effective coping strategies  Description  INTERVENTIONS:  - Assist patient/family to identify coping skills, available support systems and cultural and spiritual values  - Provide emotional support, including active listening and acknowledgement of concerns of patient and caregivers  - Reduce environmental stimuli, as able  - Provide patient education  - Assess for spiritual pain/suffering and initiate spiritual care, including notification of Pastoral Care or [place] based community as needed  - Assess effectiveness of coping strategies  Outcome: Progressing  Goal: Will report anxiety at manageable levels  Description  INTERVENTIONS:  - Administer medication as ordered  - Teach and encourage coping skills  - Provide emotional support  - Assess patient/family for anxiety and ability to cope  Outcome: Progressing     Problem: Nutrition/Hydration-ADULT  Goal: Nutrient/Hydration intake appropriate for improving, restoring or maintaining nutritional needs  Description  Monitor and assess patient's nutrition/hydration status for malnutrition  Collaborate with interdisciplinary team and initiate plan and interventions as ordered  Monitor patient's weight and dietary intake as ordered or per policy  Utilize nutrition screening tool and intervene as necessary  Determine patient's food preferences and provide high-protein, high-caloric foods as appropriate  INTERVENTIONS:  - Monitor oral intake, urinary output, labs, and treatment plans  - Assess nutrition and hydration status and recommend course of action  - Evaluate amount of meals eaten  - Assist patient with eating if necessary   - Allow adequate time for meals  - Recommend/ encourage appropriate diets, oral nutritional supplements, and vitamin/mineral supplements  - Order, calculate, and assess calorie counts as needed  - Recommend, monitor, and adjust tube feedings and TPN/PPN based on assessed needs  - Assess need for intravenous fluids  - Provide specific nutrition/hydration education as appropriate  - Include patient/family/caregiver in decisions related to nutrition  Outcome: Progressing     Problem: Nutrition/Hydration-ADULT  Goal: Nutrient/Hydration intake appropriate for improving, restoring or maintaining nutritional needs  Description  Monitor and assess patient's nutrition/hydration status for malnutrition  Collaborate with interdisciplinary team and initiate plan and interventions as ordered  Monitor patient's weight and dietary intake as ordered or per policy   Utilize nutrition screening tool and intervene as necessary  Determine patient's food preferences and provide high-protein, high-caloric foods as appropriate       INTERVENTIONS:  - Monitor oral intake, urinary output, labs, and treatment plans  - Assess nutrition and hydration status and recommend course of action  - Evaluate amount of meals eaten  - Assist patient with eating if necessary   - Allow adequate time for meals  - Recommend/ encourage appropriate diets, oral nutritional supplements, and vitamin/mineral supplements  - Order, calculate, and assess calorie counts as needed  - Recommend, monitor, and adjust tube feedings and TPN/PPN based on assessed needs  - Assess need for intravenous fluids  - Provide specific nutrition/hydration education as appropriate  - Include patient/family/caregiver in decisions related to nutrition  Outcome: Progressing     Problem: NEUROSENSORY - ADULT  Goal: Achieves stable or improved neurological status  Description  INTERVENTIONS  - Monitor and report changes in neurological status  - Monitor vital signs such as temperature, blood pressure, glucose, and any other labs ordered   - Initiate measures to prevent increased intracranial pressure  - Monitor for seizure activity and implement precautions if appropriate      Outcome: Progressing  Goal: Remains free of injury related to seizures activity  Description  INTERVENTIONS  - Maintain airway, patient safety  and administer oxygen as ordered  - Monitor patient for seizure activity, document and report duration and description of seizure to physician/advanced practitioner  - If seizure occurs,  ensure patient safety during seizure  - Reorient patient post seizure  - Seizure pads on all 4 side rails  - Instruct patient/family to notify RN of any seizure activity including if an aura is experienced  - Instruct patient/family to call for assistance with activity based on nursing assessment  - Administer anti-seizure medications if ordered    Outcome: Progressing  Goal: Achieves maximal functionality and self care  Description  INTERVENTIONS  - Monitor swallowing and airway patency with patient fatigue and changes in neurological status  - Encourage and assist patient to increase activity and self care     - Encourage visually impaired, hearing impaired and aphasic patients to use assistive/communication devices  Outcome: Progressing     Problem: SKIN/TISSUE INTEGRITY - ADULT  Goal: Skin integrity remains intact  Description  INTERVENTIONS  - Identify patients at risk for skin breakdown  - Assess and monitor skin integrity  - Assess and monitor nutrition and hydration status  - Monitor labs (i e  albumin)  - Assess for incontinence   - Turn and reposition patient  - Assist with mobility/ambulation  - Relieve pressure over bony prominences  - Avoid friction and shearing  - Provide appropriate hygiene as needed including keeping skin clean and dry  - Evaluate need for skin moisturizer/barrier cream  - Collaborate with interdisciplinary team (i e  Nutrition, Rehabilitation, etc )   - Patient/family teaching  Outcome: Progressing  Goal: Incision(s), wounds(s) or drain site(s) healing without S/S of infection  Description  INTERVENTIONS  - Assess and document risk factors for skin impairment   - Assess and document dressing, incision, wound bed, drain sites and surrounding tissue  - Consider nutrition services referral as needed  - Oral mucous membranes remain intact  - Provide patient/ family education  Outcome: Progressing     Problem: HEMATOLOGIC - ADULT  Goal: Maintains hematologic stability  Description  INTERVENTIONS  - Assess for signs and symptoms of bleeding or hemorrhage  - Monitor labs  - Administer supportive blood products/factors as ordered and appropriate  Outcome: Progressing     Problem: MUSCULOSKELETAL - ADULT  Goal: Maintain or return mobility to safest level of function  Description  INTERVENTIONS:  - Assess patient's ability to carry out ADLs; assess patient's baseline for ADL function and identify physical deficits which impact ability to perform ADLs (bathing, care of mouth/teeth, toileting, grooming, dressing, etc )  - Assess/evaluate cause of self-care deficits   - Assess range of motion  - Assess patient's mobility  - Assess patient's need for assistive devices and provide as appropriate  - Encourage maximum independence but intervene and supervise when necessary  - Involve family in performance of ADLs  - Assess for home care needs following discharge   - Consider OT consult to assist with ADL evaluation and planning for discharge  - Provide patient education as appropriate  Outcome: Progressing     Problem: PAIN - ADULT  Goal: Verbalizes/displays adequate comfort level or baseline comfort level  Description  Interventions:  - Encourage patient to monitor pain and request assistance  - Assess pain using appropriate pain scale  - Administer analgesics based on type and severity of pain and evaluate response  - Implement non-pharmacological measures as appropriate and evaluate response  - Consider cultural and social influences on pain and pain management  - Notify physician/advanced practitioner if interventions unsuccessful or patient reports new pain  Outcome: Progressing     Problem: INFECTION - ADULT  Goal: Absence or prevention of progression during hospitalization  Description  INTERVENTIONS:  - Assess and monitor for signs and symptoms of infection  - Monitor lab/diagnostic results  - Monitor all insertion sites, i e  indwelling lines, tubes, and drains  - Monitor endotracheal if appropriate and nasal secretions for changes in amount and color  - [place] appropriate cooling/warming therapies per order  - Administer medications as ordered  - Instruct and encourage patient and family to use good hand hygiene technique  - Identify and instruct in appropriate isolation precautions for identified infection/condition  Outcome: Progressing  Goal: Absence of fever/infection during neutropenic period  Description  INTERVENTIONS:  - Monitor WBC    Outcome: Progressing     Problem: SAFETY ADULT  Goal: Maintain or return to baseline ADL function  Description  INTERVENTIONS:  -  Assess patient's ability to carry out ADLs; assess patient's baseline for ADL function and identify physical deficits which impact ability to perform ADLs (bathing, care of mouth/teeth, toileting, grooming, dressing, etc )  - Assess/evaluate cause of self-care deficits   - Assess range of motion  - Assess patient's mobility; develop plan if impaired  - Assess patient's need for assistive devices and provide as appropriate  - Encourage maximum independence but intervene and supervise when necessary  - Involve family in performance of ADLs  - Assess for home care needs following discharge   - Consider OT consult to assist with ADL evaluation and planning for discharge  - Provide patient education as appropriate  Outcome: Progressing  Goal: Maintain or return mobility status to optimal level  Description  INTERVENTIONS:  - Assess patient's baseline mobility status (ambulation, transfers, stairs, etc )    - Identify cognitive and physical deficits and behaviors that affect mobility  - Identify mobility aids required to assist with transfers and/or ambulation (gait belt, sit-to-stand, lift, walker, cane, etc )  - [place] fall precautions as indicated by assessment  - Record patient progress and toleration of activity level on Mobility SBAR; progress patient to next Phase/Stage  - Instruct patient to call for assistance with activity based on assessment  - Consider rehabilitation consult to assist with strengthening/weightbearing, etc   Outcome: Progressing     Problem: DISCHARGE PLANNING  Goal: Discharge to home or other facility with appropriate resources  Description  INTERVENTIONS:  - Identify barriers to discharge w/patient and caregiver  - Arrange for needed discharge resources and transportation as appropriate  - Identify discharge learning needs (meds, wound care, etc )  - Arrange for interpretive services to assist at discharge as needed  - Refer to Case Management Department for coordinating discharge planning if the patient needs post-hospital services based on physician/advanced practitioner order or complex needs related to functional status, cognitive ability, or social support system  Outcome: Progressing     Problem: DISCHARGE PLANNING  Goal: Discharge to home or other facility with appropriate resources  Description  INTERVENTIONS:  - Identify barriers to discharge w/patient and caregiver  - Arrange for needed discharge resources and transportation as appropriate  - Identify discharge learning needs (meds, wound care, etc )  - Arrange for interpretive services to assist at discharge as needed  - Refer to Case Management Department for coordinating discharge planning if the patient needs post-hospital services based on physician/advanced practitioner order or complex needs related to functional status, cognitive ability, or social support system  Outcome: Progressing     Problem: Knowledge Deficit  Goal: Patient/family/caregiver demonstrates understanding of disease process, treatment plan, medications, and discharge instructions  Description  Complete learning assessment and assess knowledge base    Interventions:  - Provide teaching at level of understanding  - Provide teaching via preferred learning methods  Outcome: Progressing

## 2020-02-17 NOTE — ASSESSMENT & PLAN NOTE
? Dx R transverse process fracture of L3  § Continue pain medications  § Neurovascularly intact at this time, continue to monitor

## 2020-02-17 NOTE — PROGRESS NOTES
Critical Care Attending Addendum:    I have personally seen and examined the patient during ICU rounds  I discussed the case with the team, including the practitioner who completed the critical care progress note and agree with the history, exam, assessment, and plan as outlined below except where otherwise noted  I personally reviewed imaging studies in PACS, laboratory data, medications, and vital signs and have noted significant findings  Patient seen on 2/17/2020 at 10:10 AM     Subjective:  Some right hand and right shin pain but otherwise ok  Feeling bloated as well  Objective:  Vitals:    02/17/20 1000   BP: 124/61   Pulse: 86   Resp: 21   Temp:    SpO2: 95%     NAD  Aox4  Ambulatory around unit with walker  NCAT, EOMI  R sided TTP, CTAB  Minimal RUQ / R flank TTP  Abdomen mildly distended but not tympanitic  No rebound/guarding  Extremities WWP  R hand mildly ecchymotic and swollen c/w L hand    Labs/imaging reviewed    Assessment:  - S/p blunt trauma during skiing accident  - Multiple closed right sided rib fractures - 6-9th, 12th rib  - Stranding of hepatic flexure of colon  - Right hepatic lobe small laceration, posterior segment  - Right L3 transverse process fracture  - Acute pain 2/2 above  - Hypokalemia  - Mild transaminitis likely 2/2 small hepatic laceration  - Leukocytosis 2/2 SIRS response  - ABLA from injuries  - Cystic fibrosis    Plan:  Neuro: multimodal analgesia including scheduled tylenol, lidoderm patch, robaxin and IV/PO PRN narcotic  CV: no issues, MAPs maintained > 65 mmHg  Resp: continue home CF meds  GI: start bowel regimen, continue home protonix  FEN: advance to regular diet as tolerated, monitor for changes in abdominal exam with diet resumption  : voiding spontaneously   Heme: start DVT ppx  ID: no signs/sx of infx  Endo: no issues  MSK: OOB; ambulatory in unit today with PT/OT  Lines: PIV    Dispo: transfer to floor today   Will eventually plan on [facility]pt [facility]    I have spent a total of 30 minutes in the care of this patient  Of this time, 30 minutes was spent in directly providing critical care services, which may include (but not limited to) titration of vasoactive medications, ventilator management, interpretation of hemodynamic data, managing enteral or parenteral nutritional support, complex medical decision making, management of multiple organ system failure, evaluating for the presence of life-threatening injuries or illnesses, or interpretation of complex medical databases  This does not include time spent on procedures or in family discussions      Elmer Mallory MD  2/17/2020

## 2020-02-17 NOTE — ASSESSMENT & PLAN NOTE
· CT abd/pelvis 2/17: Findings suspicious for small hepatic laceration in the subcapsular region of posterior segment R hepatic lobe  Also concerning for injury to ascending colon near hepatic flexure  Atrophic pancreas    § Serial exams  § Serial Hgbs  · Pharmacologic Prophylaxis: Pharmacologic VTE Prophylaxis contraindicated due to Concern for hepatic laceration  · Mechanical Prophylaxis: sequential compression device
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
## 2020-02-17 NOTE — TRAUMA DOCUMENTATION
Cystic Fibrosis Doctor is :    Sarai Corea (transplant department)  [facility]  [phone]

## 2020-02-17 NOTE — ED NOTES
PLACED ON MONITOR AND 2ND IV STARTED PER DR NUNO REQUEST   PT WITHOUT C/O     Vahe Oar, RN  02/16/20 9716

## 2020-02-17 NOTE — ASSESSMENT & PLAN NOTE
· SpO2 goal >95%  · CT chest 2/17: nondisplaced R 12 right fracture posteriorly, nondisplaced hairline fractures of the R 6-9th ribs, posteriorly    · Pulmonary toileting with IS  · Rib fracture protocol
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
## 2020-02-17 NOTE — RESPIRATORY THERAPY NOTE
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[address] )      Social History     Socioeconomic History    Marital status: Single     Spouse name: None    Number of children: None    Years of education: None    Highest education level: None   Occupational History    None   Social Needs    Financial resource strain: None    Food insecurity:     Worry: None     Inability: None    Transportation needs:     Medical: None     Non-medical: None   Tobacco Use    Smoking status: Never Smoker    Smokeless tobacco: Never Used   Substance and Sexual Activity    Alcohol use: Yes     Comment: socially    Drug use: Never    Sexual activity: None   Lifestyle    Physical activity:     Days per week: None     Minutes per session: None    Stress: None   Relationships    Social connections:     Talks on phone: None     Gets together: None     Attends [employer] service: None     Active member of club or organization: None     Attends meetings of clubs or organizations: None     Relationship status: None    Intimate partner violence:     Fear of current or ex partner: None     Emotionally abused: None     Physically abused: None     Forced sexual activity: None   Other Topics Concern    None   Social History Narrative    None       Subjective         Objective    Physical Exam:   Assessment Type: (P) Assess only  Respiratory Pattern: (P) Normal  Chest Assessment: (P) Chest expansion symmetrical  Bilateral Breath Sounds: (P) Diminished  R Breath Sounds: (P) Coarse(slightly coarse)  O2 Device: (P) ra    Vitals:  Blood pressure 124/60, pulse 86, temperature 97 5 °F (36 4 °C), temperature source Oral, resp  rate 18, SpO2 97 %              O2 Device: (P) ra     Plan    Respiratory Plan: (P) Home Bronchodilator Patient pathway        Resp Comments: (P) I assessed pt for resp protocol pt has no resp complaints at this time and in no distress, pt has CF and uses bid udn's and mdi's at home, per protocol I followed his home regimen of meds

## 2020-02-18 VITALS
HEART RATE: 75 BPM | OXYGEN SATURATION: 96 % | SYSTOLIC BLOOD PRESSURE: 108 MMHG | DIASTOLIC BLOOD PRESSURE: 64 MMHG | WEIGHT: 177.91 LBS | HEIGHT: 70 IN | RESPIRATION RATE: 16 BRPM | TEMPERATURE: 98.6 F | BODY MASS INDEX: 25.47 KG/M2

## 2020-02-18 PROBLEM — D72.829 LEUKOCYTOSIS: Status: RESOLVED | Noted: 2020-02-17 | Resolved: 2020-02-18

## 2020-02-18 LAB
ALBUMIN SERPL BCP-MCNC: 3.3 G/DL (ref 3.5–5)
ALP SERPL-CCNC: 87 U/L (ref 46–116)
ALT SERPL W P-5'-P-CCNC: 201 U/L (ref 12–78)
ANION GAP SERPL CALCULATED.3IONS-SCNC: 5 MMOL/L (ref 4–13)
AST SERPL W P-5'-P-CCNC: 78 U/L (ref 5–45)
BILIRUB DIRECT SERPL-MCNC: 0.23 MG/DL (ref 0–0.2)
BILIRUB SERPL-MCNC: 0.68 MG/DL (ref 0.2–1)
BUN SERPL-MCNC: 15 MG/DL (ref 5–25)
CALCIUM SERPL-MCNC: 8.5 MG/DL (ref 8.3–10.1)
CHLORIDE SERPL-SCNC: 109 MMOL/L (ref 100–108)
CO2 SERPL-SCNC: 26 MMOL/L (ref 21–32)
CREAT SERPL-MCNC: 0.96 MG/DL (ref 0.6–1.3)
ERYTHROCYTE [DISTWIDTH] IN BLOOD BY AUTOMATED COUNT: 12.9 % (ref 11.6–15.1)
GFR SERPL CREATININE-BSD FRML MDRD: 110 ML/MIN/1.73SQ M
GLUCOSE SERPL-MCNC: 102 MG/DL (ref 65–140)
HCT VFR BLD AUTO: 35.7 % (ref 36.5–49.3)
HGB BLD-MCNC: 11.5 G/DL (ref 12–17)
MCH RBC QN AUTO: 29 PG (ref 26.8–34.3)
MCHC RBC AUTO-ENTMCNC: 32.2 G/DL (ref 31.4–37.4)
MCV RBC AUTO: 90 FL (ref 82–98)
PLATELET # BLD AUTO: 189 THOUSANDS/UL (ref 149–390)
PMV BLD AUTO: 9.6 FL (ref 8.9–12.7)
POTASSIUM SERPL-SCNC: 3.7 MMOL/L (ref 3.5–5.3)
PROT SERPL-MCNC: 6.2 G/DL (ref 6.4–8.2)
RBC # BLD AUTO: 3.96 MILLION/UL (ref 3.88–5.62)
SODIUM SERPL-SCNC: 140 MMOL/L (ref 136–145)
WBC # BLD AUTO: 8.36 THOUSAND/UL (ref 4.31–10.16)

## 2020-02-18 PROCEDURE — 94640 AIRWAY INHALATION TREATMENT: CPT

## 2020-02-18 PROCEDURE — 80076 HEPATIC FUNCTION PANEL: CPT | Performed by: PHYSICIAN ASSISTANT

## 2020-02-18 PROCEDURE — 99238 HOSP IP/OBS DSCHRG MGMT 30/<: CPT | Performed by: SURGERY

## 2020-02-18 PROCEDURE — 80048 BASIC METABOLIC PNL TOTAL CA: CPT | Performed by: OBSTETRICS & GYNECOLOGY

## 2020-02-18 PROCEDURE — NC001 PR NO CHARGE: Performed by: PHYSICIAN ASSISTANT

## 2020-02-18 PROCEDURE — 97530 THERAPEUTIC ACTIVITIES: CPT

## 2020-02-18 PROCEDURE — 94760 N-INVAS EAR/PLS OXIMETRY 1: CPT

## 2020-02-18 PROCEDURE — 97116 GAIT TRAINING THERAPY: CPT

## 2020-02-18 PROCEDURE — 85027 COMPLETE CBC AUTOMATED: CPT | Performed by: PHYSICIAN ASSISTANT

## 2020-02-18 RX ORDER — ACETAMINOPHEN 325 MG/1
650 TABLET ORAL EVERY 4 HOURS PRN
Qty: 30 TABLET | Refills: 0
Start: 2020-02-18

## 2020-02-18 RX ORDER — METHOCARBAMOL 500 MG/1
500 TABLET, FILM COATED ORAL EVERY 6 HOURS SCHEDULED
Qty: 40 TABLET | Refills: 0 | Status: SHIPPED | OUTPATIENT
Start: 2020-02-18

## 2020-02-18 RX ORDER — POLYETHYLENE GLYCOL 3350 17 G/17G
17 POWDER, FOR SOLUTION ORAL DAILY PRN
Qty: 14 EACH | Refills: 0
Start: 2020-02-18

## 2020-02-18 RX ORDER — OXYCODONE HYDROCHLORIDE 5 MG/1
5-10 TABLET ORAL EVERY 4 HOURS PRN
Qty: 40 TABLET | Refills: 0 | Status: SHIPPED | OUTPATIENT
Start: 2020-02-18

## 2020-02-18 RX ORDER — AMOXICILLIN 250 MG
1 CAPSULE ORAL 2 TIMES DAILY
Qty: 14 TABLET | Refills: 0 | Status: SHIPPED | OUTPATIENT
Start: 2020-02-18 | End: 2020-02-25

## 2020-02-18 RX ADMIN — ACETAMINOPHEN 975 MG: 325 TABLET ORAL at 05:20

## 2020-02-18 RX ADMIN — METHOCARBAMOL TABLETS 500 MG: 500 TABLET, COATED ORAL at 05:20

## 2020-02-18 RX ADMIN — DORNASE ALFA 2.5 MG: 1 SOLUTION RESPIRATORY (INHALATION) at 07:03

## 2020-02-18 RX ADMIN — OXYCODONE HYDROCHLORIDE 5 MG: 5 TABLET ORAL at 05:20

## 2020-02-18 RX ADMIN — LEVALBUTEROL HYDROCHLORIDE 1.25 MG: 1.25 SOLUTION, CONCENTRATE RESPIRATORY (INHALATION) at 07:02

## 2020-02-18 RX ADMIN — PANTOPRAZOLE SODIUM 40 MG: 40 TABLET, DELAYED RELEASE ORAL at 05:20

## 2020-02-18 RX ADMIN — SENNOSIDES AND DOCUSATE SODIUM 1 TABLET: 8.6; 5 TABLET ORAL at 08:14

## 2020-02-18 RX ADMIN — PANCRELIPASE 180000 UNITS: 24000; 76000; 120000 CAPSULE, DELAYED RELEASE PELLETS ORAL at 08:16

## 2020-02-18 RX ADMIN — LIDOCAINE 1 PATCH: 50 PATCH TOPICAL at 08:14

## 2020-02-18 RX ADMIN — CHLORHEXIDINE GLUCONATE 0.12% ORAL RINSE 15 ML: 1.2 LIQUID ORAL at 08:14

## 2020-02-18 RX ADMIN — SODIUM CHLORIDE SOLN NEBU 3% 4 ML: 3 NEBU SOLN at 07:03

## 2020-02-18 RX ADMIN — NYSTATIN 500000 UNITS: 100000 SUSPENSION ORAL at 08:14

## 2020-02-18 RX ADMIN — PANCRELIPASE 180000 UNITS: 24000; 76000; 120000 CAPSULE, DELAYED RELEASE PELLETS ORAL at 11:40

## 2020-02-18 RX ADMIN — METHOCARBAMOL TABLETS 500 MG: 500 TABLET, COATED ORAL at 11:35

## 2020-02-18 RX ADMIN — ENOXAPARIN SODIUM 30 MG: 30 INJECTION SUBCUTANEOUS at 08:14

## 2020-02-18 RX ADMIN — MELATONIN 5000 UNITS: at 08:14

## 2020-02-18 RX ADMIN — POLYETHYLENE GLYCOL 3350 17 G: 17 POWDER, FOR SOLUTION ORAL at 08:13

## 2020-02-18 RX ADMIN — Medication 1 TABLET: at 08:14

## 2020-02-18 RX ADMIN — OXYCODONE HYDROCHLORIDE 10 MG: 10 TABLET ORAL at 11:35

## 2020-02-18 NOTE — ASSESSMENT & PLAN NOTE
? Continue albuterol PRN, breo-ellipta, xopenex, pulmozyme, sodium chloride nebulizer, trikafta (patient brought this medication)  ? Patient reports no breathing complications at this time   Denies cough or sputum production, shortness of breath, pain with deep breathing
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
## 2020-02-18 NOTE — ASSESSMENT & PLAN NOTE
· SpO2 goal >95%: currently 100% on room air  · CT chest 2/17: nondisplaced R 12 right fracture posteriorly, nondisplaced hairline fractures of the R 6-9th ribs, posteriorly    · Pulmonary toileting with IS: Achieving 2 5L on IS  · Rib fracture protocol: patient's pain well controlled with current regimen  · F/u with trauma in [place] in 2 weeks, given contact information in discharge instructions for Dr Tri Penn at [facility]

## 2020-02-18 NOTE — ASSESSMENT & PLAN NOTE
· CT abd/pelvis 2/17: Concerning for injury to ascending colon near hepatic flexure  Atrophic pancreas  § Serial exams: on exam today, the patient states his abdomen feels less distended  § Serial Hgbs: Hgb have remained stable  ?  Dx atrophic pancreas  § Likely 2/2 CF  § Continue Creon (pancrelipase) for atrophic pancreas

## 2020-02-18 NOTE — SOCIAL WORK
Pt's medications are $21 63  Family paid over phone  Pt is supposed to do steps and then will d/c home

## 2020-02-18 NOTE — ASSESSMENT & PLAN NOTE
· CT abd/pelvis 2/17: Findings suspicious for small hepatic laceration in the subcapsular region of posterior segment R hepatic lobe  Also concerning for injury to ascending colon near hepatic flexure  Atrophic pancreas    § Serial exams: abdominal exam stable  § Serial Hgbs: Hgb have remained stable, 11 5 this AM  · Chemical prophylaxis initiated with Lovenox  · Mechanical Prophylaxis: sequential compression device

## 2020-02-18 NOTE — PHYSICAL THERAPY NOTE
PHYSICAL THERAPY NOTE    Patient Name: Saddie Goldberg GUYFJOE'Y Date: 2/18/2020 02/18/20 1200   Pain Assessment   Pain Assessment 0-10   Pain Score 7   Pain Type Acute pain   Pain Location Back   Pain Orientation Right   [facility] Pain Intervention(s) Repositioned; Ambulation/increased activity; Distraction; Emotional support   Response to Interventions tolerated   Restrictions/Precautions   Weight Bearing Precautions Per Order No   Other Precautions Fall Risk;Pain   General   Chart Reviewed Yes   Response to Previous Treatment Patient with no complaints from previous session  Family/Caregiver Present Yes   Cognition   Overall Cognitive Status WFL   Arousal/Participation Alert   Attention Within functional limits   Orientation Level Oriented X4   Memory Within functional limits   Following Commands Follows all commands and directions without difficulty   Subjective   Subjective Pt lying supine and agreeable to work w/ therapy   Bed Mobility   Supine to Sit 5  Supervision   Additional items Assist x 1   Sit to Supine Unable to assess   Additional Comments Py lying supine upon PT arrival  Pt returned seated EOB at end of session w/ family present   Transfers   Sit to Stand 5  Supervision   Additional items Assist x 1; Increased time required   Stand to Sit 5  Supervision   Additional items Assist x 1; Increased time required   Additional Comments Transfers w/ RW   Ambulation/Elevation   Gait pattern Excessively slow; Short stride;Decreased R stance;Decreased foot clearance   Gait Assistance 5  Supervision   Additional items Assist x 1   Assistive Device Rolling walker   Distance 100ft X2   Stair Management Assistance 5  Supervision   Additional items Assist x 1   Stair Management Technique One rail L;Step to pattern; Foreward  (1 trial HHA; 1 trial w/ RW as HR; educated on backwards w/ R)   Number of Stairs 3   Balance   Static Sitting Fair +   Dynamic Sitting Fair   Static Standing Fair   Dynamic Standing Fair -   Ambulatory Fair -   Endurance Deficit   Endurance Deficit Yes   Endurance Deficit Description pain   Activity Tolerance   Activity Tolerance Patient limited by pain   Nurse Made Aware RN cleared pt for therapy   Assessment   Prognosis Good   Problem List Decreased strength;Decreased endurance; Impaired balance;Decreased mobility;Pain   Assessment Pt presents with decreased mobility, strength, balance, and activity tolerance  Pt demonstrated ability to perform bed mobility and functional transfers at supervision  Pt ambulated 100 ft X2 with RW at supervision  Pt educated on performing stairs  Pt performed 1st trial w/ HHA since pt's stairs have no HR  Pt ascended stairs w/ LLE and descended w/ RLE  Pt performed 3 steps at supervision  Pt then educated on using RW on stairs w/ one UE to mimic HR if he does not have HHA available  Pt performed 3 steps at supervision  Pt then verbally educated on ascending stairs backward w/ RW if pt is having a day w/ increased pain and no assistance available  Pt's mother stated they had a cane at home and pt was verbally educated on ambulation and stairs w/ SPC  Pt verbally acknowledged understanding  Pt continues to benefit from skilled therapy to maximize functional independence  Recommendation at this time is home w/ family support  Pt would benefit from continued ambulation, functional transfers, strength, balance, and activity tolerance   Goals   Patient Goals To have less pain   STG Expiration Date 02/29/20   PT Treatment Day 1   Plan   Treatment/Interventions Functional transfer training;LE strengthening/ROM; Elevations; Endurance training;Patient/family training;Equipment eval/education; Bed mobility;Gait training;Spoke to nursing   Progress Progressing toward goals   PT Frequency   (3-6x/week)   Recommendation   Recommendation Home with family support   Equipment Recommended Walker   PT - OK to Discharge Yes     Chon Soriano, PT, DPT

## 2020-02-18 NOTE — ASSESSMENT & PLAN NOTE
CT abd/pelvis 2/17: Findings suspicious for small hepatic laceration in the subcapsular region of posterior segment R hepatic lobe  · LFT trending down:  · AST 78 this AM, down from 161 yesterday  ·  this AM, down from 272 yesterday

## 2020-02-18 NOTE — UTILIZATION REVIEW
Initial Clinical Review    2/17 Transfer from [address] [place] [address] ED  Pt at [facility] fro 2/16 thru 2/17  Admission: Date/Time/Statement: Admission Orders (From admission, onward)     Ordered        02/17/20 0254  Inpatient Admission  Once                   Orders Placed This Encounter   Procedures    Inpatient Admission     Standing Status:   Standing     Number of Occurrences:   1     Order Specific Question:   Admitting Physician     Answer:   Maximino Cobian     Order Specific Question:   Level of Care     Answer:   Critical Care     Order Specific Question:   Estimated length of stay     Answer:   More than 2 Midnights     Order Specific Question:   Certification     Answer:   I certify that inpatient services are medically necessary for this patient for a duration of greater than two midnights  See H&P and MD Progress Notes for additional information about the patient's course of treatment  ED Arrival Information     Expected Arrival Acuity Means of Arrival Escorted By Service Admission Type    - 2/17/2020 02:01 Emergent Ambulance [place] [facility] Surgery-General Urgent    Arrival Complaint    skiing accident        Chief Complaint   Patient presents with   Cherelle Santos Imbuias [address] Accident     Patient transferred from [address] [place] [address] for trauma transfer  see trauma note     Assessment/Plan:   26y Male, transfer from [facility] ED presents with Ski accident  Pt was skiing down the slope, got his ski caught in snow and got knock down by another skier at a fast speed while trying to fit his ski back on  He got hit in the RUQ/Flank region, thrown into the air and landed on his back  CT chest, abdomen, pelvis demonstrated nondisplaced fracture of right 12th rib with nondisplaced hairline fractures of the right 6th, 7th, 8th, and 9th ribs  There was free fluid adjacent to the distal tip of the right lobe of the liver, concerning for possible liver laceration    Finally, there was a fracture of the right transverse process of L3  Patient transferred to [place] for additional evaluation  PMH of Cystic Fibrosis  He is c/o back pain  Admit Inpatient level of care for  S/p skiing accident, R 6-9th, 12 rib fracture  Liver laceration, R L3 Transverse Process fracture, Leukocytosis, likely reactive and Elevated AST and ALT, previously normal  Pain control  Pulmonary toilet and incentive spirometry  Serial Hgb  Monitor abdominal exam as concern for stranding R colon --> abd exam w minimal RUQ tenderness of eval  GCS 15  SpO2 goal >95%  Zofran prn  Strict I/O  Tenderness over R chest      2/18 Progress notes; LFTs trending down  Pain control  Serial abdominal exams and serial Hgbs  Continue Creon (pancrelipase) for atrophic pancreas   Pulmonary toileting with IS: Achieving 2 5L on IS  CBC q6h  ED Triage Vitals   Temperature Pulse Respirations Blood Pressure SpO2   02/17/20 0207 02/17/20 0207 02/17/20 0207 02/17/20 0207 02/17/20 0207   97 5 °F (36 4 °C) 84 18 124/60 96 %      Temp Source Heart Rate Source Patient Position - Orthostatic VS BP Location FiO2 (%)   02/17/20 0207 02/17/20 0559 -- -- --   Oral Monitor         Pain Score       02/17/20 0207       4        Wt Readings from Last 1 Encounters:   02/17/20 80 7 kg (177 lb 14 6 oz)     Additional Vital Signs:   02/18/20 07:44:29  98 6 °F (37 °C)  75    108/64  79  96 %     02/18/20 0700            99 %  None (Room air)   02/17/20 22:34:37  98 8 °F (37 1 °C)  81    108/66  80  95 %     02/17/20 18:01:01  98 4 °F (36 9 °C)  79  16  113/70  84  98 %     02/17/20 1645    76  17  144/71           02/17/20 0505    80  14  110/58    95 %     02/17/20 0500    82  13    78  95 %     02/17/20 0405    84  16  125/63    96 %     02/17/20 0305    84  18  129/78           Pertinent Labs/Diagnostic Test Results:   2/16 Xray Right Tibia/Fibula - No acute osseous abnormality      2/16 Xray Right Hand - No acute osseous abnormality      2/16 CT Head - No acute intracranial abnormality  Paranasal sinus disease  2/16 CT Cervical Spine - No cervical spine fracture or traumatic malalignment  2/16 CT Lumbar Spine - There is fracture of the right transverse process of L3  There is nondisplaced fracture of the right 12th rib, posteriorly  There appear to be nondisplaced hairline fractures of the right 6th through 9th ribs, posteriorly  There is a small amount of free fluid adjacent to the distal tip of the right lobe of the liver  There are findings suspicious for small hepatic laceration in the subcapsular region of the posterior segment right hepatic lobe      Findings suspicious for injury to the ascending colon near the hepatic flexure of the colon, as described above  The pancreas is markedly atrophic  Small renal cysts  No hydronephrosis      Results from last 7 days   Lab Units 02/18/20  0444 02/17/20  1252 02/17/20  0636 02/16/20 2028   WBC Thousand/uL 8 36 9 12 11 33* 19 70*   HEMOGLOBIN g/dL 11 5* 11 4* 11 8* 14 0   HEMATOCRIT % 35 7* 35 0* 35 7* 43 7   PLATELETS Thousands/uL 189 190 219 288   NEUTROS ABS Thousands/µL  --   --  7 39 15 90*         Results from last 7 days   Lab Units 02/18/20 0444 02/17/20  0730 02/16/20 2028   SODIUM mmol/L 140 140 139   POTASSIUM mmol/L 3 7 3 8 3 9   CHLORIDE mmol/L 109* 112* 108*   CO2 mmol/L 26 22 20*   ANION GAP mmol/L 5 6 11   BUN mg/dL 15 18 25   CREATININE mg/dL 0 96 0 98 1 28   EGFR ml/min/1 73sq m 110 107 78   CALCIUM mg/dL 8 5 8 3 9 2     Results from last 7 days   Lab Units 02/18/20 0444 02/17/20  0730 02/16/20 2028   AST U/L 78* 161* 257*   ALT U/L 201* 272* 276*   ALK PHOS U/L 87 89 84   TOTAL PROTEIN g/dL 6 2* 6 1* 6 7   ALBUMIN g/dL 3 3* 3 5 4 6   TOTAL BILIRUBIN mg/dL 0 68 0 65 0 40   BILIRUBIN DIRECT mg/dL 0 23* 0 21*  --          Results from last 7 days   Lab Units 02/18/20  0444 02/17/20  0730 02/16/20 2028   GLUCOSE RANDOM mg/dL 102 106 104*     Results from last 7 days   Lab Units 02/16/20  2219   PROTIME seconds 12 9   INR  1 12     Results from last 7 days   Lab Units 02/16/20 2028   LIPASE u/L <10*     ED Treatment:   Medication Administration from 02/17/2020 0156 to 02/17/2020 0550       Date/Time Order Dose Route Action     02/17/2020 0330 oxyCODONE (ROXICODONE) IR tablet 5 mg 5 mg Oral Given     02/17/2020 0329 HYDROmorphone (DILAUDID) injection 0 5 mg 0 5 mg Intravenous Given        Past Medical History:   Diagnosis Date    Cystic fibrosis ([facility] )     Factor V deficiency ([facility] )      Present on Admission:   Possible liver laceration   Cystic fibrosis ([facility] )   Elevated LFTs   (Resolved) Leukocytosis      Admitting Diagnosis: Back injury [S39 [id number]]  Age/Sex: 26 y o  male     Admission Orders:  Neuro checks q4h    Scheduled Medications:  Medications:  acetaminophen 975 mg Oral Q8H [address]   chlorhexidine 15 mL Swish & Spit Q12H [address]   cholecalciferol 5,000 Units Oral Daily   dornase jacoby 2 5 mg Inhalation Daily   Vqovjxuz-Kkfvgwv-Ikskxn&Ivacaf 1 tablet Oral HS   Ugadrtqi-Wcdgshn-Lmeawm&Ivacaf 2 tablet Oral QAM   enoxaparin 30 mg Subcutaneous Q12H [address]   levalbuterol 1 25 mg Nebulization BID   lidocaine 1 patch Topical Daily   methocarbamol 500 mg Oral Q6H [address]   multivitamin-minerals 1 tablet Oral Daily   nystatin 500,000 Units Swish & Swallow Daily   pancrelipase (Lip-Prot-Amyl) 180,000 Units Oral TID With Meals   pantoprazole 40 mg Oral Early Morning   senna-docusate sodium 1 tablet Oral BID   sodium chloride 4 mL Nebulization BID     Continuous IV Infusions: None    PRN Meds:  albuterol 2 puff Inhalation Q4H PRN   HYDROmorphone 0 5 mg Intravenous Q4H PRN   ondansetron 4 mg Intravenous Q4H PRN   oxyCODONE 10 mg Oral Q4H PRN 2/17 x1, 2/18 x1   oxyCODONE 5 mg Oral Q4H PRN 2/17 x2, 2/18 x1   polyethylene glycol 17 g Oral Daily PRN     Network Utilization Review Department  [email or web address]  ATTENTION: Please call with any questions or concerns to [phone] and carefully listen to the prompts so that you are directed to the right person  All voicemails are confidential   Yohan Miller all requests for admission clinical reviews, approved or denied determinations and any other requests to dedicated fax number below belonging to the campus where the patient is receiving treatment   List of dedicated fax numbers for the Facilities:  [address] DENIALS (Administrative/Medical Necessity) [phone]   [address] (Maternity/NICU/Pediatrics) [phone]   Francesca Bowerston [phone]   Jacquelyn Rocheemily [phone]   [address] [place] [address] [phone]   [address]  [phone]   1205 [facility] [address] [place] [address] [phone]   Kevin Expose [phone]   2205 [facility]  2401 [facility] [address] [facility] [phone]

## 2020-02-18 NOTE — PROGRESS NOTES
Progress Note - Alejandro Mireles 1995, 26 y o  male MRN: [id number]    Unit/Bed#: [facility] 605-01 Encounter: [id number]    Primary Care Provider: Chris Aparicio MD   Date and time admitted to hospital: 2/17/2020  2:01 AM      Elevated LFTs  Assessment & Plan  CT abd/pelvis 2/17: Findings suspicious for small hepatic laceration in the subcapsular region of posterior segment R hepatic lobe  · LFT trending down:  · AST 78 this AM, down from 161 yesterday  ·  this AM, down from 272 yesterday    Cystic fibrosis (HCC)  Assessment & Plan    ? Continue albuterol PRN, breo-ellipta, xopenex, pulmozyme, sodium chloride nebulizer, trikafta (patient brought this medication)  ? Patient reports no breathing complications at this time  Denies cough or sputum production, shortness of breath, pain with deep breathing        Right transverse process fracture of L3  Assessment & Plan  ? Dx R transverse process fracture of L3  § Continue pain medications, pain is mainly exertional  § Neurovascularly intact at this time, continue to monitor    Ascending colon injury  Assessment & Plan  · CT abd/pelvis 2/17: Concerning for injury to ascending colon near hepatic flexure  Atrophic pancreas  § Serial exams: on exam today, the patient states his abdomen feels less distended  § Serial Hgbs: Hgb have remained stable  ? Dx atrophic pancreas  § Likely 2/2 CF  § Continue Creon (pancrelipase) for atrophic pancreas      Fractures of the right 6-9 and 12th ribs  Assessment & Plan    · SpO2 goal >95%: currently 100% on room air  · CT chest 2/17: nondisplaced R 12 right fracture posteriorly, nondisplaced hairline fractures of the R 6-9th ribs, posteriorly    · Pulmonary toileting with IS: Achieving 2 5L on IS  · Rib fracture protocol: patient's pain well controlled with current regimen  · F/u with trauma in [place] in 2 weeks, given contact information in discharge instructions for Dr August Heading at [address] laceration  Assessment & Plan    · CT abd/pelvis 2/17: Findings suspicious for small hepatic laceration in the subcapsular region of posterior segment R hepatic lobe  Also concerning for injury to ascending colon near hepatic flexure  Atrophic pancreas  § Serial exams: abdominal exam stable  § Serial Hgbs: Hgb have remained stable, 11 5 this AM  · Chemical prophylaxis initiated with Lovenox  · Mechanical Prophylaxis: sequential compression device    Leukocytosisresolved as of 2/18/2020  Assessment & Plan    § Likely 2/2 trauma  § Continue to trend  § CBC q 6 h    Within normal this AM      Disposition: Discharge to home      SUBJECTIVE:  Chief Complaint: "I feel stiff"    Subjective:  No significant 24 hour events per patient or nursing staff  Patient's main complaint remains his low back pain from L3 transverse process fracture  He states his pain is well controlled at rest, but exacerbated with exertion  He notes that the pain is primarily to the right of his midline and describes his pain as a stiffness  He denies any radiation of pain, numbness or tingling, extremity weakness  Patient states he has been participating in PT/OT without issue  He offers no other complaints at this time, denying any respiratory issues including cough, shortness of breath, pain with deep breathing or cough  Denies pain elsewhere  The patient has been cleared for discharge home by PT/OT  Patient family are comfortable and agreeable with plan for discharge today        OBJECTIVE:     Meds/Allergies     Current Facility-Administered Medications:     acetaminophen (TYLENOL) tablet 975 mg, 975 mg, Oral, Q8H [address], Paz Ye MD, 975 mg at 02/18/20 0520    albuterol (PROVENTIL HFA,VENTOLIN HFA) inhaler 2 puff, 2 puff, Inhalation, Q4H PRN, Paz Ye MD    chlorhexidine (PERIDEX) 0 12 % oral rinse 15 mL, 15 mL, Swish & Sravan, Q12H [address], Paz Ye MD, 15 mL at 02/18/20 8458    cholecalciferol (VITAMIN D3) tablet 5,000 Units, 5,000 Units, Oral, Daily, Isabelle Schwab, MD, 5,000 Units at 02/18/20 0739    dornase jacoby (PULMOZYME) inhalation solution 2 5 mg, 2 5 mg, Inhalation, Daily, Isabelle Schwab, MD, 2 5 mg at 02/18/20 0703    Afvspgze-Pcmwzrj-Rdutmm&Ivacaf 100-50-75 & 150 MG TBPK 1 tablet, 1 tablet, Oral, HS, Isabelle Schwab, MD, 1 tablet at 02/17/20 1832    Okiliblf-Mczgkgd-Xzrwcs&Ivacaf 100-50-75 & 150 MG TBPK 2 tablet, 2 tablet, Oral, QAM, Isabelle Schwab, MD, 2 tablet at 02/18/20 0815    enoxaparin (LOVENOX) subcutaneous injection 30 mg, 30 mg, Subcutaneous, Q12H [facility], Francisca Marie PA-C, 30 mg at 02/18/20 3356    HYDROmorphone (DILAUDID) injection 0 5 mg, 0 5 mg, Intravenous, Q4H PRN, Isabelle Schwab, MD, 0 5 mg at 02/17/20 0329    levalbuterol [facility]) inhalation solution 1 25 mg, 1 25 mg, Nebulization, BID, Gina Kc PA-C, 1 25 mg at 02/18/20 0702    lidocaine (LIDODERM) 5 % patch 1 patch, 1 patch, Topical, Daily, Isabelle Schwab, MD, 1 patch at 02/18/20 8273    methocarbamol (ROBAXIN) tablet 500 mg, 500 mg, Oral, Q6H [facility], Isabelle Schwab, MD, 500 mg at 02/18/20 2335    multivitamin-minerals (CENTRUM) tablet 1 tablet, 1 tablet, Oral, Daily, Isabelle Schwab, MD, 1 tablet at 02/18/20 7972    nystatin (MYCOSTATIN) oral suspension 500,000 Units, 500,000 Units, Swish & Swallow, Daily, Gina Kc PA-C, 500,000 Units at 02/18/20 5005    ondansetron [facility]""[place] [facility]) injection 4 mg, 4 mg, Intravenous, Q4H PRN, Isabelle Schwab, MD    oxyCODONE (ROXICODONE) immediate release tablet 10 mg, 10 mg, Oral, Q4H PRN, Isabelle Schwab, MD, 10 mg at 02/17/20 1624    oxyCODONE (ROXICODONE) IR tablet 5 mg, 5 mg, Oral, Q4H PRN, Isabelle Schwab, MD, 5 mg at 02/18/20 0520    pancrelipase (Lip-Prot-Amyl) (CREON) delayed release capsule 180,000 Units, 180,000 Units, Oral, TID With Meals, Daniel Arteaga MD, 180,000 Units at 02/18/20 0816    pantoprazole (PROTONIX) EC tablet 40 mg, 40 mg, Oral, Early Morning, Shannon Pond MD, 40 mg at 02/18/20 0520    polyethylene glycol (MIRALAX) packet 17 g, 17 g, Oral, Daily PRN, Leonor Delgado PA-C, 17 g at 02/18/20 0813    senna-docusate sodium (SENOKOT S) 8 6-50 mg per tablet 1 tablet, 1 tablet, Oral, BID, OLIVERIO Arguello-C, 1 tablet at 02/18/20 6712    sodium chloride 3 % inhalation solution 4 mL, 4 mL, Nebulization, BID, OLIVERIO Arguello-C, 4 mL at 02/18/20 0703     Vitals:   Vitals:    02/18/20 0744   BP: 108/64   Pulse: 75   Resp:    Temp: 98 6 °F (37 °C)   SpO2: 96%       Intake/Output:  I/O       02/16 0701 - 02/17 0700 02/17 0701 - 02/18 0700 02/18 0701 - 02/19 0700    P  O   720     Total Intake(mL/kg)  720 (8 9)     Urine (mL/kg/hr) 1400      Total Output 1400      Net -1400 +720                   Nutrition/GI Proph/Bowel Reg: Regular house/Protonix/Miralax and Senokot    Physical Exam:   GENERAL APPEARANCE: Well-appearing male, resting comfortably in bed in no acute distress  NEURO: Awake and alert  GCS 15  No sensory deficit  Non-focal examination  HEENT: Normocephalic/atraumatic  PERRL, EOMI  Mucous membranes moist  CV: Regular rate and rhythm, no murmur  Distal pulses intact  Capillary refill < 2 seconds  LUNGS: Clear to auscultation bilaterally and bilateral chest rise  No chest wall tenderness or crepitus with palpation, no overlying skin changes noted  No evidence of respiratory distress, satting well on room air  Achieving 2 5L on IS  GI: No overlying skin changes on inspection  Normoactive bowel sounds x 4  Soft, non-tender, non-distended, no guarding/rigidity  : Deferred, voiding clear yellow urine per patient  MSK: Able to move all extremities with full active painless ROM  5/5 strength   Mild swelling of the right hand, with sensation and  strength intact  SKIN: Right hand skin abrasion, no active bleeding or evidence of infection    Invasive Devices     Peripheral Intravenous Line Peripheral IV 02/16/20 Left Forearm 1 day                 Lab Results:   BMP/CMP:   Lab Results   Component Value Date    SODIUM 140 02/18/2020    K 3 7 02/18/2020     (H) 02/18/2020    CO2 26 02/18/2020    BUN 15 02/18/2020    CREATININE 0 96 02/18/2020    CALCIUM 8 5 02/18/2020    AST 78 (H) 02/18/2020     (H) 02/18/2020    ALKPHOS 87 02/18/2020    EGFR 110 02/18/2020    and CBC:   Lab Results   Component Value Date    WBC 8 36 02/18/2020    HGB 11 5 (L) 02/18/2020    HCT 35 7 (L) 02/18/2020    MCV 90 02/18/2020     02/18/2020    MCH 29 0 02/18/2020    MCHC 32 2 02/18/2020    RDW 12 9 02/18/2020    MPV 9 6 02/18/2020        Imaging/EKG Studies: No new imaging  VTE Prophylaxis: Sequential compression device (Venodyne)  and Enoxaparin (Lovenox)

## 2020-02-18 NOTE — DISCHARGE INSTRUCTIONS
Trauma Rib Fracture Discharge Instructions for Rib Fractures, Solid Organ Injury and Transverse Spinous Process Fracture: Activity:  - Walking and normal light activities are encouraged  Normal daily activities including climbing steps are okay  - Avoid lifting greater than 10 pounds, any strenuous activities and/or exercise, and contact sports until cleared by the trauma service  - Avoid driving and crowded places until cleared by the trauma service  - Continue using the incentive spirometer at least 10 times every hour while awake  Return to work:    - You may return to work once you are cleared by the trauma service  Medications:    - You may resume all of your regular medications, including blood thinners and aspirin, after going home unless otherwise instructed  Please refer to your discharge medication list for further details  - Please complete 30-day course of Lovenox for DVT prophylaxis  - Please take the pain medications as directed  - You are encouraged to use non-narcotic pain medications first and whenever possible  Reserve the use of narcotic pain medication for moderate to severe pain not controlled by non-narcotic medications   - No driving while taking narcotic pain medications  - You may become constipated, especially if taking pain medications  You may take any over the counter stool softeners or laxatives as needed  Examples: Milk of Magnesia, Colace, Senna  Additional Instructions:  - If you have any questions or concerns after discharge please call the office   - Call office or return to ER if fever greater than 101, chills, worsening/uncontrollable chest of abdominal pain, develop productive cough, increasing shortness of breath, and/or difficulty breathing

## 2020-02-18 NOTE — ASSESSMENT & PLAN NOTE
? Dx R transverse process fracture of L3  § Continue pain medications, pain is mainly exertional  § Neurovascularly intact at this time, continue to monitor

## 2020-02-18 NOTE — PLAN OF CARE
Problem: Prexisting or High Potential for Compromised Skin Integrity  Goal: Skin integrity is maintained or improved  Description  INTERVENTIONS:  - Identify patients at risk for skin breakdown  - Assess and monitor skin integrity  - Assess and monitor nutrition and hydration status  - Monitor labs   - Assess for incontinence   - Turn and reposition patient  - Assist with mobility/ambulation  - Relieve pressure over bony prominences  - Avoid friction and shearing  - Provide appropriate hygiene as needed including keeping skin clean and dry  - Evaluate need for skin moisturizer/barrier cream  - Collaborate with interdisciplinary team   - Patient/family teaching  - Consider wound care consult   Outcome: Progressing     Problem: Potential for Falls  Goal: Patient will remain free of falls  Description  INTERVENTIONS:  - Assess patient frequently for physical needs  -  Identify cognitive and physical deficits and behaviors that affect risk of falls    -  [place] fall precautions as indicated by assessment   - Educate patient/family on patient safety including physical limitations  - Instruct patient to call for assistance with activity based on assessment  - Modify environment to reduce risk of injury  - Consider OT/PT consult to assist with strengthening/mobility  Outcome: Progressing     Problem: COPING  Goal: Pt/Family able to verbalize concerns and demonstrate effective coping strategies  Description  INTERVENTIONS:  - Assist patient/family to identify coping skills, available support systems and cultural and spiritual values  - Provide emotional support, including active listening and acknowledgement of concerns of patient and caregivers  - Reduce environmental stimuli, as able  - Provide patient education  - Assess for spiritual pain/suffering and initiate spiritual care, including notification of Pastoral Care or ruthann based community as needed  - Assess effectiveness of coping strategies  Outcome: Progressing  Goal: Will report anxiety at manageable levels  Description  INTERVENTIONS:  - Administer medication as ordered  - Teach and encourage coping skills  - Provide emotional support  - Assess patient/family for anxiety and ability to cope  Outcome: Progressing     Problem: Nutrition/Hydration-ADULT  Goal: Nutrient/Hydration intake appropriate for improving, restoring or maintaining nutritional needs  Description  Monitor and assess patient's nutrition/hydration status for malnutrition  Collaborate with interdisciplinary team and initiate plan and interventions as ordered  Monitor patient's weight and dietary intake as ordered or per policy  Utilize nutrition screening tool and intervene as necessary  Determine patient's food preferences and provide high-protein, high-caloric foods as appropriate       INTERVENTIONS:  - Monitor oral intake, urinary output, labs, and treatment plans  - Assess nutrition and hydration status and recommend course of action  - Evaluate amount of meals eaten  - Assist patient with eating if necessary   - Allow adequate time for meals  - Recommend/ encourage appropriate diets, oral nutritional supplements, and vitamin/mineral supplements  - Order, calculate, and assess calorie counts as needed  - Recommend, monitor, and adjust tube feedings and TPN/PPN based on assessed needs  - Assess need for intravenous fluids  - Provide specific nutrition/hydration education as appropriate  - Include patient/family/caregiver in decisions related to nutrition  Outcome: Progressing     Problem: NEUROSENSORY - ADULT  Goal: Achieves stable or improved neurological status  Description  INTERVENTIONS  - Monitor and report changes in neurological status  - Monitor vital signs such as temperature, blood pressure, glucose, and any other labs ordered   - Initiate measures to prevent increased intracranial pressure  - Monitor for seizure activity and implement precautions if appropriate      Outcome: Progressing  Goal: Remains free of injury related to seizures activity  Description  INTERVENTIONS  - Maintain airway, patient safety  and administer oxygen as ordered  - Monitor patient for seizure activity, document and report duration and description of seizure to physician/advanced practitioner  - If seizure occurs,  ensure patient safety during seizure  - Reorient patient post seizure  - Seizure pads on all 4 side rails  - Instruct patient/family to notify RN of any seizure activity including if an aura is experienced  - Instruct patient/family to call for assistance with activity based on nursing assessment  - Administer anti-seizure medications if ordered    Outcome: Progressing  Goal: Achieves maximal functionality and self care  Description  INTERVENTIONS  - Monitor swallowing and airway patency with patient fatigue and changes in neurological status  - Encourage and assist patient to increase activity and self care     - Encourage visually impaired, hearing impaired and aphasic patients to use assistive/communication devices  Outcome: Progressing     Problem: SKIN/TISSUE INTEGRITY - ADULT  Goal: Skin integrity remains intact  Description  INTERVENTIONS  - Identify patients at risk for skin breakdown  - Assess and monitor skin integrity  - Assess and monitor nutrition and hydration status  - Monitor labs (i e  albumin)  - Assess for incontinence   - Turn and reposition patient  - Assist with mobility/ambulation  - Relieve pressure over bony prominences  - Avoid friction and shearing  - Provide appropriate hygiene as needed including keeping skin clean and dry  - Evaluate need for skin moisturizer/barrier cream  - Collaborate with interdisciplinary team (i e  Nutrition, Rehabilitation, etc )   - Patient/family teaching  Outcome: Progressing  Goal: Incision(s), wounds(s) or drain site(s) healing without S/S of infection  Description  INTERVENTIONS  - Assess and document risk factors for skin impairment   - Assess and document dressing, incision, wound bed, drain sites and surrounding tissue  - Consider nutrition services referral as needed  - Oral mucous membranes remain intact  - Provide patient/ family education  Outcome: Progressing     Problem: HEMATOLOGIC - ADULT  Goal: Maintains hematologic stability  Description  INTERVENTIONS  - Assess for signs and symptoms of bleeding or hemorrhage  - Monitor labs  - Administer supportive blood products/factors as ordered and appropriate  Outcome: Progressing     Problem: MUSCULOSKELETAL - ADULT  Goal: Maintain or return mobility to safest level of function  Description  INTERVENTIONS:  - Assess patient's ability to carry out ADLs; assess patient's baseline for ADL function and identify physical deficits which impact ability to perform ADLs (bathing, care of mouth/teeth, toileting, grooming, dressing, etc )  - Assess/evaluate cause of self-care deficits   - Assess range of motion  - Assess patient's mobility  - Assess patient's need for assistive devices and provide as appropriate  - Encourage maximum independence but intervene and supervise when necessary  - Involve family in performance of ADLs  - Assess for home care needs following discharge   - Consider OT consult to assist with ADL evaluation and planning for discharge  - Provide patient education as appropriate  Outcome: Progressing     Problem: PAIN - ADULT  Goal: Verbalizes/displays adequate comfort level or baseline comfort level  Description  Interventions:  - Encourage patient to monitor pain and request assistance  - Assess pain using appropriate pain scale  - Administer analgesics based on type and severity of pain and evaluate response  - Implement non-pharmacological measures as appropriate and evaluate response  - Consider cultural and social influences on pain and pain management  - Notify physician/advanced practitioner if interventions unsuccessful or patient reports new pain  Outcome: Progressing Problem: INFECTION - ADULT  Goal: Absence or prevention of progression during hospitalization  Description  INTERVENTIONS:  - Assess and monitor for signs and symptoms of infection  - Monitor lab/diagnostic results  - Monitor all insertion sites, i e  indwelling lines, tubes, and drains  - Monitor endotracheal if appropriate and nasal secretions for changes in amount and color  - [place] appropriate cooling/warming therapies per order  - Administer medications as ordered  - Instruct and encourage patient and family to use good hand hygiene technique  - Identify and instruct in appropriate isolation precautions for identified infection/condition  Outcome: Progressing  Goal: Absence of fever/infection during neutropenic period  Description  INTERVENTIONS:  - Monitor WBC    Outcome: Progressing     Problem: SAFETY ADULT  Goal: Maintain or return to baseline ADL function  Description  INTERVENTIONS:  -  Assess patient's ability to carry out ADLs; assess patient's baseline for ADL function and identify physical deficits which impact ability to perform ADLs (bathing, care of mouth/teeth, toileting, grooming, dressing, etc )  - Assess/evaluate cause of self-care deficits   - Assess range of motion  - Assess patient's mobility; develop plan if impaired  - Assess patient's need for assistive devices and provide as appropriate  - Encourage maximum independence but intervene and supervise when necessary  - Involve family in performance of ADLs  - Assess for home care needs following discharge   - Consider OT consult to assist with ADL evaluation and planning for discharge  - Provide patient education as appropriate  Outcome: Progressing  Goal: Maintain or return mobility status to optimal level  Description  INTERVENTIONS:  - Assess patient's baseline mobility status (ambulation, transfers, stairs, etc )    - Identify cognitive and physical deficits and behaviors that affect mobility  - Identify mobility aids required to assist with transfers and/or ambulation (gait belt, sit-to-stand, lift, walker, cane, etc )  - Helen fall precautions as indicated by assessment  - Record patient progress and toleration of activity level on Mobility SBAR; progress patient to next Phase/Stage  - Instruct patient to call for assistance with activity based on assessment  - Consider rehabilitation consult to assist with strengthening/weightbearing, etc   Outcome: Progressing     Problem: DISCHARGE PLANNING  Goal: Discharge to home or other facility with appropriate resources  Description  INTERVENTIONS:  - Identify barriers to discharge w/patient and caregiver  - Arrange for needed discharge resources and transportation as appropriate  - Identify discharge learning needs (meds, wound care, etc )  - Arrange for interpretive services to assist at discharge as needed  - Refer to Case Management Department for coordinating discharge planning if the patient needs post-hospital services based on physician/advanced practitioner order or complex needs related to functional status, cognitive ability, or social support system  Outcome: Progressing     Problem: Knowledge Deficit  Goal: Patient/family/caregiver demonstrates understanding of disease process, treatment plan, medications, and discharge instructions  Description  Complete learning assessment and assess knowledge base    Interventions:  - Provide teaching at level of understanding  - Provide teaching via preferred learning methods  Outcome: Progressing

## 2020-02-19 NOTE — UTILIZATION REVIEW
Notification of Discharge  This is a Notification of Discharge from our facility [address]  Please be advised that this patient has been discharge from our facility  Below you will find the admission and discharge date and time including the patients disposition  PRESENTATION DATE: 2/17/2020  2:01 AM  OBS ADMISSION DATE:   IP ADMISSION DATE: 2/17/20 0252   DISCHARGE DATE: 2/18/2020  1:27 PM  DISPOSITION: Home/Self Care Home/Self Care   Admission Orders listed below:  Admission Orders (From admission, onward)     Ordered        02/17/20 0254  Inpatient Admission  Once                   Please contact the UR Department if additional information is required to close this patient's authorization/case  [address] Utilization Review Department  Main: [phone] x carefully listen to the prompts  All voicemails are confidential   [email or web address]  Send all requests for admission clinical reviews, approved or denied determinations and any other requests to dedicated fax number below belonging to the campus where the patient is receiving treatment   List of dedicated fax numbers:  [address] DENIALS (Administrative/Medical Necessity) [phone]   [address] (Maternity/NICU/Pediatrics) [phone]   Hayley Benavidez [phone]   Jose Matos [phone]   Taj Umana [phone]   [facility] [address] [place] [address] [phone]   [facility]  [phone]   [address] [facility] [address]  [address] [facility] [address] [facility] [phone]